# Patient Record
Sex: FEMALE | Race: OTHER | HISPANIC OR LATINO
[De-identification: names, ages, dates, MRNs, and addresses within clinical notes are randomized per-mention and may not be internally consistent; named-entity substitution may affect disease eponyms.]

---

## 2017-05-26 PROBLEM — Z00.00 ENCOUNTER FOR PREVENTIVE HEALTH EXAMINATION: Status: ACTIVE | Noted: 2017-05-26

## 2017-06-22 ENCOUNTER — APPOINTMENT (OUTPATIENT)
Dept: ANTEPARTUM | Facility: CLINIC | Age: 27
End: 2017-06-22

## 2017-06-22 ENCOUNTER — ASOB RESULT (OUTPATIENT)
Age: 27
End: 2017-06-22

## 2017-10-24 ENCOUNTER — INPATIENT (INPATIENT)
Facility: HOSPITAL | Age: 27
LOS: 1 days | Discharge: HOME | End: 2017-10-26
Attending: OBSTETRICS & GYNECOLOGY | Admitting: OBSTETRICS & GYNECOLOGY
Payer: COMMERCIAL

## 2017-10-24 VITALS — WEIGHT: 174.17 LBS | HEIGHT: 65 IN

## 2017-10-24 DIAGNOSIS — Z3A.00 WEEKS OF GESTATION OF PREGNANCY NOT SPECIFIED: ICD-10-CM

## 2017-10-24 DIAGNOSIS — O26.899 OTHER SPECIFIED PREGNANCY RELATED CONDITIONS, UNSPECIFIED TRIMESTER: ICD-10-CM

## 2017-10-24 DIAGNOSIS — Z34.80 ENCOUNTER FOR SUPERVISION OF OTHER NORMAL PREGNANCY, UNSPECIFIED TRIMESTER: ICD-10-CM

## 2017-10-24 LAB
BASOPHILS # BLD AUTO: 0.1 K/UL — SIGNIFICANT CHANGE UP (ref 0–0.2)
BASOPHILS NFR BLD AUTO: 0.4 % — SIGNIFICANT CHANGE UP (ref 0–2)
BLD GP AB SCN SERPL QL: NEGATIVE — SIGNIFICANT CHANGE UP
EOSINOPHIL # BLD AUTO: 0 K/UL — SIGNIFICANT CHANGE UP (ref 0–0.5)
EOSINOPHIL NFR BLD AUTO: 0.2 % — SIGNIFICANT CHANGE UP (ref 0–6)
HCT VFR BLD CALC: 38.4 % — SIGNIFICANT CHANGE UP (ref 34.5–45)
HGB BLD-MCNC: 12.9 G/DL — SIGNIFICANT CHANGE UP (ref 11.5–15.5)
LYMPHOCYTES # BLD AUTO: 1.4 K/UL — SIGNIFICANT CHANGE UP (ref 1–3.3)
LYMPHOCYTES # BLD AUTO: 9.6 % — LOW (ref 13–44)
MCHC RBC-ENTMCNC: 28.7 PG — SIGNIFICANT CHANGE UP (ref 27–34)
MCHC RBC-ENTMCNC: 33.5 GM/DL — SIGNIFICANT CHANGE UP (ref 32–36)
MCV RBC AUTO: 85.7 FL — SIGNIFICANT CHANGE UP (ref 80–100)
MONOCYTES # BLD AUTO: 0.8 K/UL — SIGNIFICANT CHANGE UP (ref 0–0.9)
MONOCYTES NFR BLD AUTO: 5.2 % — SIGNIFICANT CHANGE UP (ref 2–14)
NEUTROPHILS # BLD AUTO: 12.7 K/UL — HIGH (ref 1.8–7.4)
NEUTROPHILS NFR BLD AUTO: 84.6 % — HIGH (ref 43–77)
PLATELET # BLD AUTO: 219 K/UL — SIGNIFICANT CHANGE UP (ref 150–400)
RBC # BLD: 4.48 M/UL — SIGNIFICANT CHANGE UP (ref 3.8–5.2)
RBC # FLD: 11.4 % — SIGNIFICANT CHANGE UP (ref 10.3–14.5)
RH IG SCN BLD-IMP: POSITIVE — SIGNIFICANT CHANGE UP
RH IG SCN BLD-IMP: POSITIVE — SIGNIFICANT CHANGE UP
WBC # BLD: 15.1 K/UL — HIGH (ref 3.8–10.5)
WBC # FLD AUTO: 15.1 K/UL — HIGH (ref 3.8–10.5)

## 2017-10-24 RX ORDER — AMPICILLIN TRIHYDRATE 250 MG
CAPSULE ORAL
Qty: 0 | Refills: 0 | Status: DISCONTINUED | OUTPATIENT
Start: 2017-10-24 | End: 2017-10-24

## 2017-10-24 RX ORDER — OXYCODONE HYDROCHLORIDE 5 MG/1
5 TABLET ORAL EVERY 4 HOURS
Qty: 0 | Refills: 0 | Status: DISCONTINUED | OUTPATIENT
Start: 2017-10-24 | End: 2017-10-26

## 2017-10-24 RX ORDER — TETANUS TOXOID, REDUCED DIPHTHERIA TOXOID AND ACELLULAR PERTUSSIS VACCINE, ADSORBED 5; 2.5; 8; 8; 2.5 [IU]/.5ML; [IU]/.5ML; UG/.5ML; UG/.5ML; UG/.5ML
0.5 SUSPENSION INTRAMUSCULAR ONCE
Qty: 0 | Refills: 0 | Status: DISCONTINUED | OUTPATIENT
Start: 2017-10-25 | End: 2017-10-26

## 2017-10-24 RX ORDER — DOCUSATE SODIUM 100 MG
100 CAPSULE ORAL
Qty: 0 | Refills: 0 | Status: DISCONTINUED | OUTPATIENT
Start: 2017-10-25 | End: 2017-10-26

## 2017-10-24 RX ORDER — HYDROCORTISONE 1 %
1 OINTMENT (GRAM) TOPICAL EVERY 4 HOURS
Qty: 0 | Refills: 0 | Status: DISCONTINUED | OUTPATIENT
Start: 2017-10-25 | End: 2017-10-26

## 2017-10-24 RX ORDER — OXYTOCIN 10 UNIT/ML
41.67 VIAL (ML) INJECTION
Qty: 20 | Refills: 0 | Status: DISCONTINUED | OUTPATIENT
Start: 2017-10-25 | End: 2017-10-26

## 2017-10-24 RX ORDER — AER TRAVELER 0.5 G/1
1 SOLUTION RECTAL; TOPICAL EVERY 4 HOURS
Qty: 0 | Refills: 0 | Status: DISCONTINUED | OUTPATIENT
Start: 2017-10-25 | End: 2017-10-26

## 2017-10-24 RX ORDER — DIBUCAINE 1 %
1 OINTMENT (GRAM) RECTAL EVERY 4 HOURS
Qty: 0 | Refills: 0 | Status: DISCONTINUED | OUTPATIENT
Start: 2017-10-25 | End: 2017-10-26

## 2017-10-24 RX ORDER — AMPICILLIN TRIHYDRATE 250 MG
1 CAPSULE ORAL EVERY 4 HOURS
Qty: 0 | Refills: 0 | Status: DISCONTINUED | OUTPATIENT
Start: 2017-10-24 | End: 2017-10-24

## 2017-10-24 RX ORDER — SODIUM CHLORIDE 9 MG/ML
1000 INJECTION, SOLUTION INTRAVENOUS
Qty: 0 | Refills: 0 | Status: DISCONTINUED | OUTPATIENT
Start: 2017-10-24 | End: 2017-10-24

## 2017-10-24 RX ORDER — HYDROCORTISONE 1 %
1 OINTMENT (GRAM) TOPICAL EVERY 4 HOURS
Qty: 0 | Refills: 0 | Status: DISCONTINUED | OUTPATIENT
Start: 2017-10-24 | End: 2017-10-25

## 2017-10-24 RX ORDER — ONDANSETRON 8 MG/1
4 TABLET, FILM COATED ORAL ONCE
Qty: 0 | Refills: 0 | Status: COMPLETED | OUTPATIENT
Start: 2017-10-24 | End: 2017-10-24

## 2017-10-24 RX ORDER — OXYTOCIN 10 UNIT/ML
4 VIAL (ML) INJECTION
Qty: 30 | Refills: 0 | Status: DISCONTINUED | OUTPATIENT
Start: 2017-10-24 | End: 2017-10-26

## 2017-10-24 RX ORDER — MAGNESIUM HYDROXIDE 400 MG/1
30 TABLET, CHEWABLE ORAL
Qty: 0 | Refills: 0 | Status: DISCONTINUED | OUTPATIENT
Start: 2017-10-25 | End: 2017-10-26

## 2017-10-24 RX ORDER — LANOLIN
1 OINTMENT (GRAM) TOPICAL EVERY 6 HOURS
Qty: 0 | Refills: 0 | Status: DISCONTINUED | OUTPATIENT
Start: 2017-10-25 | End: 2017-10-26

## 2017-10-24 RX ORDER — KETOROLAC TROMETHAMINE 30 MG/ML
30 SYRINGE (ML) INJECTION ONCE
Qty: 0 | Refills: 0 | Status: DISCONTINUED | OUTPATIENT
Start: 2017-10-24 | End: 2017-10-26

## 2017-10-24 RX ORDER — AMPICILLIN TRIHYDRATE 250 MG
2 CAPSULE ORAL ONCE
Qty: 0 | Refills: 0 | Status: COMPLETED | OUTPATIENT
Start: 2017-10-24 | End: 2017-10-24

## 2017-10-24 RX ORDER — SIMETHICONE 80 MG/1
80 TABLET, CHEWABLE ORAL EVERY 6 HOURS
Qty: 0 | Refills: 0 | Status: DISCONTINUED | OUTPATIENT
Start: 2017-10-25 | End: 2017-10-26

## 2017-10-24 RX ORDER — DIPHENHYDRAMINE HCL 50 MG
25 CAPSULE ORAL EVERY 6 HOURS
Qty: 0 | Refills: 0 | Status: DISCONTINUED | OUTPATIENT
Start: 2017-10-25 | End: 2017-10-26

## 2017-10-24 RX ORDER — SODIUM CHLORIDE 9 MG/ML
500 INJECTION, SOLUTION INTRAVENOUS ONCE
Qty: 0 | Refills: 0 | Status: COMPLETED | OUTPATIENT
Start: 2017-10-24 | End: 2017-10-24

## 2017-10-24 RX ORDER — SODIUM CHLORIDE 9 MG/ML
3 INJECTION INTRAMUSCULAR; INTRAVENOUS; SUBCUTANEOUS EVERY 8 HOURS
Qty: 0 | Refills: 0 | Status: DISCONTINUED | OUTPATIENT
Start: 2017-10-25 | End: 2017-10-26

## 2017-10-24 RX ORDER — PRAMOXINE HYDROCHLORIDE 150 MG/15G
1 AEROSOL, FOAM RECTAL EVERY 4 HOURS
Qty: 0 | Refills: 0 | Status: DISCONTINUED | OUTPATIENT
Start: 2017-10-24 | End: 2017-10-25

## 2017-10-24 RX ORDER — OXYTOCIN 10 UNIT/ML
333.33 VIAL (ML) INJECTION
Qty: 20 | Refills: 0 | Status: DISCONTINUED | OUTPATIENT
Start: 2017-10-24 | End: 2017-10-25

## 2017-10-24 RX ORDER — OXYTOCIN 10 UNIT/ML
41.67 VIAL (ML) INJECTION
Qty: 20 | Refills: 0 | Status: DISCONTINUED | OUTPATIENT
Start: 2017-10-24 | End: 2017-10-26

## 2017-10-24 RX ORDER — PRAMOXINE HYDROCHLORIDE 150 MG/15G
1 AEROSOL, FOAM RECTAL EVERY 4 HOURS
Qty: 0 | Refills: 0 | Status: DISCONTINUED | OUTPATIENT
Start: 2017-10-25 | End: 2017-10-26

## 2017-10-24 RX ORDER — OXYCODONE HYDROCHLORIDE 5 MG/1
5 TABLET ORAL
Qty: 0 | Refills: 0 | Status: DISCONTINUED | OUTPATIENT
Start: 2017-10-24 | End: 2017-10-26

## 2017-10-24 RX ORDER — IBUPROFEN 200 MG
600 TABLET ORAL EVERY 6 HOURS
Qty: 0 | Refills: 0 | Status: COMPLETED | OUTPATIENT
Start: 2017-10-24 | End: 2018-09-22

## 2017-10-24 RX ORDER — ACETAMINOPHEN 500 MG
975 TABLET ORAL EVERY 6 HOURS
Qty: 0 | Refills: 0 | Status: COMPLETED | OUTPATIENT
Start: 2017-10-24 | End: 2018-09-22

## 2017-10-24 RX ORDER — GLYCERIN ADULT
1 SUPPOSITORY, RECTAL RECTAL AT BEDTIME
Qty: 0 | Refills: 0 | Status: DISCONTINUED | OUTPATIENT
Start: 2017-10-25 | End: 2017-10-26

## 2017-10-24 RX ORDER — SODIUM CHLORIDE 9 MG/ML
3 INJECTION INTRAMUSCULAR; INTRAVENOUS; SUBCUTANEOUS EVERY 8 HOURS
Qty: 0 | Refills: 0 | Status: DISCONTINUED | OUTPATIENT
Start: 2017-10-24 | End: 2017-10-25

## 2017-10-24 RX ORDER — AER TRAVELER 0.5 G/1
1 SOLUTION RECTAL; TOPICAL EVERY 4 HOURS
Qty: 0 | Refills: 0 | Status: DISCONTINUED | OUTPATIENT
Start: 2017-10-24 | End: 2017-10-25

## 2017-10-24 RX ORDER — DIBUCAINE 1 %
1 OINTMENT (GRAM) RECTAL EVERY 4 HOURS
Qty: 0 | Refills: 0 | Status: DISCONTINUED | OUTPATIENT
Start: 2017-10-24 | End: 2017-10-25

## 2017-10-24 RX ORDER — CITRIC ACID/SODIUM CITRATE 300-500 MG
15 SOLUTION, ORAL ORAL EVERY 4 HOURS
Qty: 0 | Refills: 0 | Status: DISCONTINUED | OUTPATIENT
Start: 2017-10-24 | End: 2017-10-24

## 2017-10-24 RX ADMIN — Medication 125 MILLIUNIT(S)/MIN: at 20:22

## 2017-10-24 RX ADMIN — ONDANSETRON 4 MILLIGRAM(S): 8 TABLET, FILM COATED ORAL at 16:30

## 2017-10-24 RX ADMIN — Medication 15 MILLILITER(S): at 17:11

## 2017-10-24 RX ADMIN — Medication 216 GRAM(S): at 16:20

## 2017-10-24 RX ADMIN — Medication 4 MILLIUNIT(S)/MIN: at 17:44

## 2017-10-24 RX ADMIN — SODIUM CHLORIDE 125 MILLILITER(S): 9 INJECTION, SOLUTION INTRAVENOUS at 16:31

## 2017-10-24 RX ADMIN — SODIUM CHLORIDE 1000 MILLILITER(S): 9 INJECTION, SOLUTION INTRAVENOUS at 16:00

## 2017-10-25 ENCOUNTER — TRANSCRIPTION ENCOUNTER (OUTPATIENT)
Age: 27
End: 2017-10-25

## 2017-10-25 LAB
HBV SURFACE AG SERPL QL IA: SIGNIFICANT CHANGE UP
HCT VFR BLD CALC: 34.3 % — LOW (ref 34.5–45)
HGB BLD-MCNC: 10.9 G/DL — LOW (ref 11.5–15.5)
RUBV IGG SER-ACNC: 4.4 INDEX — SIGNIFICANT CHANGE UP
RUBV IGG SER-IMP: POSITIVE — SIGNIFICANT CHANGE UP
T PALLIDUM AB TITR SER: NEGATIVE — SIGNIFICANT CHANGE UP

## 2017-10-25 RX ORDER — ACETAMINOPHEN 500 MG
3 TABLET ORAL
Qty: 0 | Refills: 0 | DISCHARGE
Start: 2017-10-25

## 2017-10-25 RX ORDER — DOCUSATE SODIUM 100 MG
1 CAPSULE ORAL
Qty: 0 | Refills: 0 | DISCHARGE
Start: 2017-10-25

## 2017-10-25 RX ORDER — IBUPROFEN 200 MG
1 TABLET ORAL
Qty: 0 | Refills: 0 | DISCHARGE
Start: 2017-10-25

## 2017-10-25 RX ORDER — SIMETHICONE 80 MG/1
1 TABLET, CHEWABLE ORAL
Qty: 0 | Refills: 0 | DISCHARGE
Start: 2017-10-25

## 2017-10-25 RX ORDER — ACETAMINOPHEN 500 MG
975 TABLET ORAL EVERY 6 HOURS
Qty: 0 | Refills: 0 | Status: DISCONTINUED | OUTPATIENT
Start: 2017-10-25 | End: 2017-10-26

## 2017-10-25 RX ORDER — INFLUENZA VIRUS VACCINE 15; 15; 15; 15 UG/.5ML; UG/.5ML; UG/.5ML; UG/.5ML
0.5 SUSPENSION INTRAMUSCULAR ONCE
Qty: 0 | Refills: 0 | Status: COMPLETED | OUTPATIENT
Start: 2017-10-25 | End: 2017-10-26

## 2017-10-25 RX ORDER — IBUPROFEN 200 MG
600 TABLET ORAL EVERY 6 HOURS
Qty: 0 | Refills: 0 | Status: DISCONTINUED | OUTPATIENT
Start: 2017-10-25 | End: 2017-10-26

## 2017-10-25 RX ADMIN — Medication 975 MILLIGRAM(S): at 23:46

## 2017-10-25 RX ADMIN — Medication 100 MILLIGRAM(S): at 06:10

## 2017-10-25 RX ADMIN — Medication 600 MILLIGRAM(S): at 06:12

## 2017-10-25 RX ADMIN — Medication 975 MILLIGRAM(S): at 19:10

## 2017-10-25 RX ADMIN — Medication 600 MILLIGRAM(S): at 23:46

## 2017-10-25 RX ADMIN — Medication 975 MILLIGRAM(S): at 18:33

## 2017-10-25 RX ADMIN — Medication 975 MILLIGRAM(S): at 06:12

## 2017-10-25 RX ADMIN — Medication 600 MILLIGRAM(S): at 12:32

## 2017-10-25 RX ADMIN — Medication 975 MILLIGRAM(S): at 13:15

## 2017-10-25 RX ADMIN — Medication 975 MILLIGRAM(S): at 12:32

## 2017-10-25 RX ADMIN — Medication 600 MILLIGRAM(S): at 19:10

## 2017-10-25 RX ADMIN — Medication 600 MILLIGRAM(S): at 13:15

## 2017-10-25 RX ADMIN — Medication 600 MILLIGRAM(S): at 18:33

## 2017-10-25 RX ADMIN — Medication 1 TABLET(S): at 12:32

## 2017-10-25 NOTE — DISCHARGE NOTE OB - CARE PLAN
Principal Discharge DX:	Vaginal delivery  Goal:	routine post partum course  Instructions for follow-up, activity and diet:	follow-up in 6 weeks

## 2017-10-25 NOTE — DISCHARGE NOTE OB - PATIENT PORTAL LINK FT
“You can access the FollowHealth Patient Portal, offered by [facility], by registering with the following website: [email or web address]”

## 2017-10-25 NOTE — DISCHARGE NOTE OB - MEDICATION SUMMARY - MEDICATIONS TO TAKE
I will START or STAY ON the medications listed below when I get home from the hospital:    acetaminophen 325 mg oral tablet  -- 3 tab(s) by mouth every 6 hours  -- Indication: For pain    ibuprofen 600 mg oral tablet  -- 1 tab(s) by mouth every 6 hours  -- Indication: For pain    docusate sodium 100 mg oral capsule  -- 1 cap(s) by mouth 2 times a day, As needed, Stool Softening  -- Indication: For constipation    simethicone 80 mg oral tablet, chewable  -- 1 tab(s) by mouth every 6 hours, As needed, Gas  -- Indication: For gas

## 2017-10-25 NOTE — PROGRESS NOTE ADULT - ASSESSMENT
A/P:  26y  PPD # 1      S/P                      doing well    PMHx: hx of abnl pap s/p TASHA, 2014 TOP, lasik, rt yolette de la torrex  Current Issues:

## 2017-10-25 NOTE — DISCHARGE NOTE OB - CARE PROVIDER_API CALL
Forrest Parmar (MD), Obstetrics and Gynecology  [address] [place] [address]  Phone: [phone]  Fax: [phone]

## 2017-10-25 NOTE — PROGRESS NOTE ADULT - SUBJECTIVE AND OBJECTIVE BOX
Postpartum Note- PPD#1    Allergies    No Known Allergies    Intolerances        RPR Negative  Blood Type  A  --  Positive        S:Patient is a  26y   G  [id number]      PPD#1         S/P      Patient w/o complaints, pain is controlled.    Pt is OOB, tolerating PO, passing flatus. Lochia WNL.     O:  Vital Signs Last 24 Hrs  T(C): 36.7 (25 Oct 2017 05:00), Max: 36.9 (24 Oct 2017 19:50)  T(F): 98.1 (25 Oct 2017 05:00), Max: 98.4 (24 Oct 2017 19:50)  HR: 71 (25 Oct 2017 05:00) (68 - 87)  BP: 102/62 (25 Oct 2017 05:00) (92/55 - 123/58)  BP(mean): --  RR: 18 (25 Oct 2017 05:00) (18 - 18)  SpO2: 98% (24 Oct 2017 21:20) (97% - 100%)     Gen: NAD  Abdomen: Soft, nontender, non-distended, fundus firm.  Lochia WNL  Ext: Neg edema, Neg calf tenderness    LABS:    Hemoglobin: 12.9 g/dL (10-24-17 @ 16:06)      Hematocrit: 38.4 % (10-24-17 @ 16:06)

## 2017-10-26 VITALS
SYSTOLIC BLOOD PRESSURE: 95 MMHG | HEART RATE: 60 BPM | TEMPERATURE: 98 F | DIASTOLIC BLOOD PRESSURE: 60 MMHG | RESPIRATION RATE: 18 BRPM

## 2017-10-26 PROCEDURE — 87340 HEPATITIS B SURFACE AG IA: CPT

## 2017-10-26 PROCEDURE — G0463: CPT

## 2017-10-26 PROCEDURE — 59050 FETAL MONITOR W/REPORT: CPT

## 2017-10-26 PROCEDURE — 86850 RBC ANTIBODY SCREEN: CPT

## 2017-10-26 PROCEDURE — 59025 FETAL NON-STRESS TEST: CPT

## 2017-10-26 PROCEDURE — 86780 TREPONEMA PALLIDUM: CPT

## 2017-10-26 PROCEDURE — 85027 COMPLETE CBC AUTOMATED: CPT

## 2017-10-26 PROCEDURE — 85018 HEMOGLOBIN: CPT

## 2017-10-26 PROCEDURE — 90686 IIV4 VACC NO PRSV 0.5 ML IM: CPT

## 2017-10-26 PROCEDURE — 86901 BLOOD TYPING SEROLOGIC RH(D): CPT

## 2017-10-26 PROCEDURE — 86900 BLOOD TYPING SEROLOGIC ABO: CPT

## 2017-10-26 PROCEDURE — 86762 RUBELLA ANTIBODY: CPT

## 2017-10-26 RX ADMIN — INFLUENZA VIRUS VACCINE 0.5 MILLILITER(S): 15; 15; 15; 15 SUSPENSION INTRAMUSCULAR at 06:41

## 2017-10-26 RX ADMIN — Medication 600 MILLIGRAM(S): at 07:30

## 2017-10-26 RX ADMIN — Medication 975 MILLIGRAM(S): at 07:30

## 2017-10-26 RX ADMIN — Medication 975 MILLIGRAM(S): at 00:20

## 2017-10-26 RX ADMIN — Medication 600 MILLIGRAM(S): at 00:20

## 2017-10-26 RX ADMIN — Medication 975 MILLIGRAM(S): at 06:41

## 2017-10-26 RX ADMIN — Medication 600 MILLIGRAM(S): at 06:41

## 2017-10-26 NOTE — PROGRESS NOTE ADULT - ATTENDING COMMENTS
Agree with assessment and plan. Pt doing well without complaints.   Vitals stable. Exam Benign  - Routine post partum care
Agree with assessment and plan. Pt doing well without complaints.   Vitals stable. Exam Benign  - Routine post partum care

## 2017-10-26 NOTE — PROGRESS NOTE ADULT - SUBJECTIVE AND OBJECTIVE BOX
S: Patient doing well. Minimal lochia. Pain controlled.    O: Vital Signs Last 24 Hrs  T(C): 36.8 (26 Oct 2017 05:00), Max: 36.9 (25 Oct 2017 18:00)  T(F): 98.2 (26 Oct 2017 05:00), Max: 98.4 (25 Oct 2017 18:00)  HR: 60 (26 Oct 2017 05:00) (60 - 79)  BP: 95/60 (26 Oct 2017 05:00) (95/60 - 105/67)  BP(mean): --  RR: 18 (26 Oct 2017 05:00) (18 - 18)  SpO2: --    Gen: NAD  Abd: soft, NT, ND, fundus firm below umbilicus  Lochia: moderate  Ext: no tenderness    Labs:                        10.9   x     )-----------( x        ( 25 Oct 2017 07:30 )             34.3       A: 26y PPD# s/p  doing well.    Plan:

## 2020-05-21 ENCOUNTER — APPOINTMENT (OUTPATIENT)
Dept: ANTEPARTUM | Facility: CLINIC | Age: 30
End: 2020-05-21
Payer: COMMERCIAL

## 2020-05-21 PROCEDURE — 76815 OB US LIMITED FETUS(S): CPT

## 2020-05-21 PROCEDURE — 76819 FETAL BIOPHYS PROFIL W/O NST: CPT

## 2020-05-22 ENCOUNTER — APPOINTMENT (OUTPATIENT)
Dept: ANTEPARTUM | Facility: CLINIC | Age: 30
End: 2020-05-22

## 2020-07-06 ENCOUNTER — INPATIENT (INPATIENT)
Facility: HOSPITAL | Age: 30
LOS: 0 days | Discharge: HOME | End: 2020-07-07
Attending: SPECIALIST | Admitting: SPECIALIST

## 2020-07-06 ENCOUNTER — TRANSCRIPTION ENCOUNTER (OUTPATIENT)
Age: 30
End: 2020-07-06

## 2020-07-06 VITALS
TEMPERATURE: 99 F | SYSTOLIC BLOOD PRESSURE: 107 MMHG | HEART RATE: 85 BPM | DIASTOLIC BLOOD PRESSURE: 68 MMHG | RESPIRATION RATE: 16 BRPM

## 2020-07-06 DIAGNOSIS — Z98.890 OTHER SPECIFIED POSTPROCEDURAL STATES: Chronic | ICD-10-CM

## 2020-07-06 DIAGNOSIS — O26.899 OTHER SPECIFIED PREGNANCY RELATED CONDITIONS, UNSPECIFIED TRIMESTER: ICD-10-CM

## 2020-07-06 DIAGNOSIS — Z3A.00 WEEKS OF GESTATION OF PREGNANCY NOT SPECIFIED: ICD-10-CM

## 2020-07-06 LAB
BASOPHILS # BLD AUTO: 0.03 K/UL — SIGNIFICANT CHANGE UP (ref 0–0.2)
BASOPHILS NFR BLD AUTO: 0.3 % — SIGNIFICANT CHANGE UP (ref 0–2)
BLD GP AB SCN SERPL QL: NEGATIVE — SIGNIFICANT CHANGE UP
EOSINOPHIL # BLD AUTO: 0.1 K/UL — SIGNIFICANT CHANGE UP (ref 0–0.5)
EOSINOPHIL NFR BLD AUTO: 1.1 % — SIGNIFICANT CHANGE UP (ref 0–6)
HCT VFR BLD CALC: 36.1 % — SIGNIFICANT CHANGE UP (ref 34.5–45)
HGB BLD-MCNC: 11.6 G/DL — SIGNIFICANT CHANGE UP (ref 11.5–15.5)
IMM GRANULOCYTES NFR BLD AUTO: 0.5 % — SIGNIFICANT CHANGE UP (ref 0–1.5)
LYMPHOCYTES # BLD AUTO: 2.08 K/UL — SIGNIFICANT CHANGE UP (ref 1–3.3)
LYMPHOCYTES # BLD AUTO: 22.3 % — SIGNIFICANT CHANGE UP (ref 13–44)
MCHC RBC-ENTMCNC: 25.8 PG — LOW (ref 27–34)
MCHC RBC-ENTMCNC: 32.1 % — SIGNIFICANT CHANGE UP (ref 32–36)
MCV RBC AUTO: 80.2 FL — SIGNIFICANT CHANGE UP (ref 80–100)
MONOCYTES # BLD AUTO: 0.81 K/UL — SIGNIFICANT CHANGE UP (ref 0–0.9)
MONOCYTES NFR BLD AUTO: 8.7 % — SIGNIFICANT CHANGE UP (ref 2–14)
NEUTROPHILS # BLD AUTO: 6.26 K/UL — SIGNIFICANT CHANGE UP (ref 1.8–7.4)
NEUTROPHILS NFR BLD AUTO: 67.1 % — SIGNIFICANT CHANGE UP (ref 43–77)
NRBC # FLD: 0 K/UL — SIGNIFICANT CHANGE UP (ref 0–0)
PLATELET # BLD AUTO: 238 K/UL — SIGNIFICANT CHANGE UP (ref 150–400)
PMV BLD: 9.8 FL — SIGNIFICANT CHANGE UP (ref 7–13)
RBC # BLD: 4.5 M/UL — SIGNIFICANT CHANGE UP (ref 3.8–5.2)
RBC # FLD: 14.8 % — HIGH (ref 10.3–14.5)
RH IG SCN BLD-IMP: POSITIVE — SIGNIFICANT CHANGE UP
RH IG SCN BLD-IMP: POSITIVE — SIGNIFICANT CHANGE UP
SARS-COV-2 RNA SPEC QL NAA+PROBE: SIGNIFICANT CHANGE UP
T PALLIDUM AB TITR SER: NEGATIVE — SIGNIFICANT CHANGE UP
WBC # BLD: 9.33 K/UL — SIGNIFICANT CHANGE UP (ref 3.8–10.5)
WBC # FLD AUTO: 9.33 K/UL — SIGNIFICANT CHANGE UP (ref 3.8–10.5)

## 2020-07-06 RX ORDER — IBUPROFEN 200 MG
600 TABLET ORAL EVERY 6 HOURS
Refills: 0 | Status: DISCONTINUED | OUTPATIENT
Start: 2020-07-06 | End: 2020-07-07

## 2020-07-06 RX ORDER — LANOLIN
1 OINTMENT (GRAM) TOPICAL EVERY 6 HOURS
Refills: 0 | Status: DISCONTINUED | OUTPATIENT
Start: 2020-07-06 | End: 2020-07-07

## 2020-07-06 RX ORDER — AER TRAVELER 0.5 G/1
1 SOLUTION RECTAL; TOPICAL EVERY 4 HOURS
Refills: 0 | Status: DISCONTINUED | OUTPATIENT
Start: 2020-07-06 | End: 2020-07-07

## 2020-07-06 RX ORDER — SODIUM CHLORIDE 9 MG/ML
1000 INJECTION, SOLUTION INTRAVENOUS
Refills: 0 | Status: DISCONTINUED | OUTPATIENT
Start: 2020-07-06 | End: 2020-07-06

## 2020-07-06 RX ORDER — DIBUCAINE 1 %
1 OINTMENT (GRAM) RECTAL EVERY 6 HOURS
Refills: 0 | Status: DISCONTINUED | OUTPATIENT
Start: 2020-07-06 | End: 2020-07-07

## 2020-07-06 RX ORDER — SODIUM CHLORIDE 9 MG/ML
3 INJECTION INTRAMUSCULAR; INTRAVENOUS; SUBCUTANEOUS EVERY 8 HOURS
Refills: 0 | Status: DISCONTINUED | OUTPATIENT
Start: 2020-07-06 | End: 2020-07-07

## 2020-07-06 RX ORDER — BENZOCAINE 10 %
1 GEL (GRAM) MUCOUS MEMBRANE EVERY 6 HOURS
Refills: 0 | Status: DISCONTINUED | OUTPATIENT
Start: 2020-07-06 | End: 2020-07-07

## 2020-07-06 RX ORDER — OXYTOCIN 10 UNIT/ML
333.33 VIAL (ML) INJECTION
Qty: 20 | Refills: 0 | Status: DISCONTINUED | OUTPATIENT
Start: 2020-07-06 | End: 2020-07-06

## 2020-07-06 RX ORDER — AMPICILLIN TRIHYDRATE 250 MG
1 CAPSULE ORAL EVERY 4 HOURS
Refills: 0 | Status: DISCONTINUED | OUTPATIENT
Start: 2020-07-06 | End: 2020-07-06

## 2020-07-06 RX ORDER — ACETAMINOPHEN 500 MG
975 TABLET ORAL
Refills: 0 | Status: DISCONTINUED | OUTPATIENT
Start: 2020-07-06 | End: 2020-07-07

## 2020-07-06 RX ORDER — KETOROLAC TROMETHAMINE 30 MG/ML
30 SYRINGE (ML) INJECTION ONCE
Refills: 0 | Status: DISCONTINUED | OUTPATIENT
Start: 2020-07-06 | End: 2020-07-06

## 2020-07-06 RX ORDER — MAGNESIUM HYDROXIDE 400 MG/1
30 TABLET, CHEWABLE ORAL
Refills: 0 | Status: DISCONTINUED | OUTPATIENT
Start: 2020-07-06 | End: 2020-07-07

## 2020-07-06 RX ORDER — IBUPROFEN 200 MG
600 TABLET ORAL EVERY 6 HOURS
Refills: 0 | Status: COMPLETED | OUTPATIENT
Start: 2020-07-06 | End: 2021-06-04

## 2020-07-06 RX ORDER — PRAMOXINE HYDROCHLORIDE 150 MG/15G
1 AEROSOL, FOAM RECTAL EVERY 4 HOURS
Refills: 0 | Status: DISCONTINUED | OUTPATIENT
Start: 2020-07-06 | End: 2020-07-07

## 2020-07-06 RX ORDER — TETANUS TOXOID, REDUCED DIPHTHERIA TOXOID AND ACELLULAR PERTUSSIS VACCINE, ADSORBED 5; 2.5; 8; 8; 2.5 [IU]/.5ML; [IU]/.5ML; UG/.5ML; UG/.5ML; UG/.5ML
0.5 SUSPENSION INTRAMUSCULAR ONCE
Refills: 0 | Status: DISCONTINUED | OUTPATIENT
Start: 2020-07-06 | End: 2020-07-07

## 2020-07-06 RX ORDER — AMPICILLIN TRIHYDRATE 250 MG
2 CAPSULE ORAL ONCE
Refills: 0 | Status: COMPLETED | OUTPATIENT
Start: 2020-07-06 | End: 2020-07-06

## 2020-07-06 RX ORDER — HYDROCORTISONE 1 %
1 OINTMENT (GRAM) TOPICAL EVERY 6 HOURS
Refills: 0 | Status: DISCONTINUED | OUTPATIENT
Start: 2020-07-06 | End: 2020-07-07

## 2020-07-06 RX ORDER — SIMETHICONE 80 MG/1
80 TABLET, CHEWABLE ORAL EVERY 4 HOURS
Refills: 0 | Status: DISCONTINUED | OUTPATIENT
Start: 2020-07-06 | End: 2020-07-07

## 2020-07-06 RX ORDER — DIPHENHYDRAMINE HCL 50 MG
25 CAPSULE ORAL EVERY 6 HOURS
Refills: 0 | Status: DISCONTINUED | OUTPATIENT
Start: 2020-07-06 | End: 2020-07-07

## 2020-07-06 RX ADMIN — SODIUM CHLORIDE 3 MILLILITER(S): 9 INJECTION INTRAMUSCULAR; INTRAVENOUS; SUBCUTANEOUS at 22:00

## 2020-07-06 RX ADMIN — Medication 600 MILLIGRAM(S): at 18:15

## 2020-07-06 RX ADMIN — SODIUM CHLORIDE 125 MILLILITER(S): 9 INJECTION, SOLUTION INTRAVENOUS at 06:58

## 2020-07-06 RX ADMIN — Medication 975 MILLIGRAM(S): at 22:00

## 2020-07-06 RX ADMIN — Medication 30 MILLIGRAM(S): at 09:14

## 2020-07-06 RX ADMIN — Medication 216 GRAM(S): at 06:55

## 2020-07-06 RX ADMIN — Medication 1000 MILLIUNIT(S)/MIN: at 09:15

## 2020-07-06 RX ADMIN — Medication 975 MILLIGRAM(S): at 11:48

## 2020-07-06 RX ADMIN — SODIUM CHLORIDE 3 MILLILITER(S): 9 INJECTION INTRAMUSCULAR; INTRAVENOUS; SUBCUTANEOUS at 17:17

## 2020-07-06 NOTE — OB PROVIDER DELIVERY SUMMARY - NSPROVIDERDELIVERYNOTE_OBGYN_ALL_OB_FT
ob attending    pt with atony after delivery-- given 56590gel cytotec [place] x1, pp pitocin 40u, mccabe to gravity    resolved brisk bleeding, uterus empty by palpation    pt low risk for DVT, sepsis  Taniya BHARDWAJ

## 2020-07-06 NOTE — OB PROVIDER TRIAGE NOTE - HISTORY OF PRESENT ILLNESS
30y/o  @38.5wks presents with painful contractions since this morning, pain scale 10/10.  Reports good fetal movement  Denies LOF/VB    Allergies: Denies  Medications: PNV    Medical HX: Denies  Surgical HX: foot sx , eye sx , leep 2013  Denies Psy/Etoh/Smoke/Drugs

## 2020-07-06 NOTE — DISCHARGE NOTE OB - PATIENT PORTAL LINK FT
You can access the FollowMyHealth Patient Portal offered by [facility] by registering at the following website: [email or web address]. By joining [employer]’s FollowMyHealth portal, you will also be able to view your health information using other applications (apps) compatible with our system.

## 2020-07-06 NOTE — OB PROVIDER TRIAGE NOTE - NSHPPHYSICALEXAM_GEN_ALL_CORE
Vital Signs Last 24 Hrs  T(C): 37 (06 Jul 2020 06:41), Max: 37 (06 Jul 2020 06:41)  T(F): 98.6 (06 Jul 2020 06:41), Max: 98.6 (06 Jul 2020 06:41)  HR: 85 (06 Jul 2020 06:41) (85 - 85)  BP: 107/68 (06 Jul 2020 06:41) (107/68 - 107/68)  RR: 16 (06 Jul 2020 06:41) (16 - 16)    Assessment reveals VSS  Abdomen soft, NT, gravid  Cat 1 tracing, ctx every 4mins  Transabdominal Ultrasound- vtx   Vaginal Exam- 5/90/-3  A&Ox3  Lungs- clear bilateral  Heart- normal rate and rhythm      PLAN: Admit for Labor

## 2020-07-06 NOTE — DISCHARGE NOTE OB - MEDICATION SUMMARY - MEDICATIONS TO TAKE
I will START or STAY ON the medications listed below when I get home from the hospital:    PNV Prenatal oral tablet  -- 1 tab(s) by mouth once a day  -- Indication: For Normal vaginal delivery

## 2020-07-06 NOTE — PROVIDER CONTACT NOTE (OTHER) - ASSESSMENT
fundus firm, moderate lochia, orthostatics vital signs wnl, tolerated ambulating, u/a 100 cc of clear urine.

## 2020-07-06 NOTE — DISCHARGE NOTE OB - CARE PLAN
Principal Discharge DX:	Normal vaginal delivery  Goal:	recovery  Assessment and plan of treatment:	regular

## 2020-07-06 NOTE — OB PROVIDER H&P - PROBLEM SELECTOR PLAN 1
Admit for Labor  D/W Dr. Magaña  Routine Orders  Epidural for pain management  Covid swabbed and partner   Ampicillin for GBS positive

## 2020-07-06 NOTE — DISCHARGE NOTE OB - CARE PROVIDER_API CALL
Edgardo Mills  OBSTETRICS AND GYNECOLOGY  [address]  [place] [address]  Phone: [phone]  Fax: [phone]  Follow Up Time:

## 2020-07-06 NOTE — OB RN DELIVERY SUMMARY - NS_SEPSISRSKCALC_OBGYN_ALL_OB_FT
EOS calculated successfully. EOS Risk Factor: 0.5/1000 live births ([facility] national incidence); GA=38w5d; Temp=98.6; ROM=0.583; GBS='Positive'; Antibiotics='No antibiotics or any antibiotics < 2 hrs prior to birth'

## 2020-07-06 NOTE — OB PROVIDER TRIAGE NOTE - NS_OBGYNHISTORY_OBGYN_ALL_OB_FT
GYN: Positive HPV, Leep   OB: TOPx1         10/24/2017, 38wks 6#4      AP course uncomplicated  -GBS Positive

## 2020-07-07 VITALS
RESPIRATION RATE: 18 BRPM | HEART RATE: 62 BPM | SYSTOLIC BLOOD PRESSURE: 106 MMHG | OXYGEN SATURATION: 100 % | DIASTOLIC BLOOD PRESSURE: 64 MMHG | TEMPERATURE: 98 F

## 2020-07-07 RX ADMIN — SODIUM CHLORIDE 3 MILLILITER(S): 9 INJECTION INTRAMUSCULAR; INTRAVENOUS; SUBCUTANEOUS at 06:36

## 2020-07-07 NOTE — PROGRESS NOTE ADULT - SUBJECTIVE AND OBJECTIVE BOX
S: Patient doing well. Minimal lochia. Pain controlled.  Post Partum day : 1  O: Vital Signs Last 24 Hrs  T(C): 36.6 (2020 06:18), Max: 37.3 (2020 12:00)  T(F): 97.9 (2020 06:18), Max: 99.1 (2020 12:00)  HR: 62 (2020 06:18) (62 - 86)  BP: 106/64 (2020 06:18) (99/50 - 114/56)  BP(mean): --  RR: 18 (2020 06:18) (14 - 18)  SpO2: 100% (2020 06:18) (98% - 100%)    Gen: No acute distress  Abd: soft, NT,  fundus firm below umbilicus  Lochia: Normal  Ext: no tenderness    Labs:                        11.6   9.33  )-----------( 238      ( 2020 06:33 )             36.1       A: 29y PPD # 1 s/p  doing well.    Plan: Discharge home

## 2021-01-05 ENCOUNTER — APPOINTMENT (OUTPATIENT)
Dept: OBGYN | Facility: CLINIC | Age: 31
End: 2021-01-05
Payer: COMMERCIAL

## 2021-01-05 PROCEDURE — 99072 ADDL SUPL MATRL&STAF TM PHE: CPT

## 2021-01-05 PROCEDURE — 36415 COLL VENOUS BLD VENIPUNCTURE: CPT

## 2021-01-05 PROCEDURE — 99395 PREV VISIT EST AGE 18-39: CPT

## 2021-01-13 ENCOUNTER — APPOINTMENT (OUTPATIENT)
Dept: ANTEPARTUM | Facility: CLINIC | Age: 31
End: 2021-01-13
Payer: COMMERCIAL

## 2021-01-13 PROCEDURE — 99072 ADDL SUPL MATRL&STAF TM PHE: CPT

## 2021-01-13 PROCEDURE — 76813 OB US NUCHAL MEAS 1 GEST: CPT

## 2021-01-13 PROCEDURE — 76801 OB US < 14 WKS SINGLE FETUS: CPT

## 2021-02-10 ENCOUNTER — APPOINTMENT (OUTPATIENT)
Dept: OBGYN | Facility: CLINIC | Age: 31
End: 2021-02-10

## 2021-02-17 ENCOUNTER — APPOINTMENT (OUTPATIENT)
Dept: OBGYN | Facility: CLINIC | Age: 31
End: 2021-02-17
Payer: COMMERCIAL

## 2021-02-17 PROCEDURE — 0502F SUBSEQUENT PRENATAL CARE: CPT

## 2021-03-10 ENCOUNTER — APPOINTMENT (OUTPATIENT)
Dept: ANTEPARTUM | Facility: CLINIC | Age: 31
End: 2021-03-10

## 2021-03-24 ENCOUNTER — APPOINTMENT (OUTPATIENT)
Dept: ANTEPARTUM | Facility: CLINIC | Age: 31
End: 2021-03-24
Payer: MEDICAID

## 2021-03-24 PROCEDURE — 76811 OB US DETAILED SNGL FETUS: CPT

## 2021-03-24 PROCEDURE — 76817 TRANSVAGINAL US OBSTETRIC: CPT

## 2021-03-24 PROCEDURE — 99072 ADDL SUPL MATRL&STAF TM PHE: CPT

## 2021-04-14 ENCOUNTER — APPOINTMENT (OUTPATIENT)
Dept: OBGYN | Facility: CLINIC | Age: 31
End: 2021-04-14
Payer: MEDICAID

## 2021-04-14 PROCEDURE — 0502F SUBSEQUENT PRENATAL CARE: CPT

## 2021-05-05 ENCOUNTER — APPOINTMENT (OUTPATIENT)
Dept: OBGYN | Facility: CLINIC | Age: 31
End: 2021-05-05
Payer: MEDICAID

## 2021-05-05 ENCOUNTER — NON-APPOINTMENT (OUTPATIENT)
Age: 31
End: 2021-05-05

## 2021-05-05 PROCEDURE — 0502F SUBSEQUENT PRENATAL CARE: CPT

## 2021-05-19 ENCOUNTER — APPOINTMENT (OUTPATIENT)
Dept: ANTEPARTUM | Facility: CLINIC | Age: 31
End: 2021-05-19
Payer: MEDICAID

## 2021-05-19 PROCEDURE — 76819 FETAL BIOPHYS PROFIL W/O NST: CPT

## 2021-05-19 PROCEDURE — 76816 OB US FOLLOW-UP PER FETUS: CPT

## 2021-06-02 ENCOUNTER — APPOINTMENT (OUTPATIENT)
Dept: OBGYN | Facility: CLINIC | Age: 31
End: 2021-06-02

## 2021-06-16 ENCOUNTER — APPOINTMENT (OUTPATIENT)
Dept: OBGYN | Facility: CLINIC | Age: 31
End: 2021-06-16
Payer: MEDICAID

## 2021-06-16 PROCEDURE — 0502F SUBSEQUENT PRENATAL CARE: CPT

## 2021-06-30 ENCOUNTER — APPOINTMENT (OUTPATIENT)
Dept: OBGYN | Facility: CLINIC | Age: 31
End: 2021-06-30
Payer: MEDICAID

## 2021-06-30 PROCEDURE — 0502F SUBSEQUENT PRENATAL CARE: CPT

## 2021-07-07 ENCOUNTER — APPOINTMENT (OUTPATIENT)
Dept: ANTEPARTUM | Facility: CLINIC | Age: 31
End: 2021-07-07
Payer: MEDICAID

## 2021-07-07 ENCOUNTER — APPOINTMENT (OUTPATIENT)
Dept: OBGYN | Facility: CLINIC | Age: 31
End: 2021-07-07
Payer: MEDICAID

## 2021-07-07 PROCEDURE — 76816 OB US FOLLOW-UP PER FETUS: CPT

## 2021-07-07 PROCEDURE — ZZZZZ: CPT

## 2021-07-07 PROCEDURE — 76819 FETAL BIOPHYS PROFIL W/O NST: CPT

## 2021-07-14 ENCOUNTER — APPOINTMENT (OUTPATIENT)
Dept: OBGYN | Facility: CLINIC | Age: 31
End: 2021-07-14
Payer: MEDICAID

## 2021-07-14 PROCEDURE — 0502F SUBSEQUENT PRENATAL CARE: CPT

## 2021-07-19 ENCOUNTER — ASOB RESULT (OUTPATIENT)
Age: 31
End: 2021-07-19

## 2021-07-19 ENCOUNTER — APPOINTMENT (OUTPATIENT)
Dept: ANTEPARTUM | Facility: CLINIC | Age: 31
End: 2021-07-19

## 2021-07-19 ENCOUNTER — OUTPATIENT (OUTPATIENT)
Dept: OUTPATIENT SERVICES | Facility: HOSPITAL | Age: 31
LOS: 1 days | Discharge: HOME | End: 2021-07-19
Payer: MEDICAID

## 2021-07-19 VITALS
TEMPERATURE: 98 F | RESPIRATION RATE: 16 BRPM | DIASTOLIC BLOOD PRESSURE: 65 MMHG | HEART RATE: 87 BPM | SYSTOLIC BLOOD PRESSURE: 113 MMHG

## 2021-07-19 VITALS — SYSTOLIC BLOOD PRESSURE: 90 MMHG | HEART RATE: 78 BPM | DIASTOLIC BLOOD PRESSURE: 53 MMHG

## 2021-07-19 DIAGNOSIS — Z98.890 OTHER SPECIFIED POSTPROCEDURAL STATES: Chronic | ICD-10-CM

## 2021-07-19 DIAGNOSIS — Z3A.00 WEEKS OF GESTATION OF PREGNANCY NOT SPECIFIED: ICD-10-CM

## 2021-07-19 DIAGNOSIS — O26.899 OTHER SPECIFIED PREGNANCY RELATED CONDITIONS, UNSPECIFIED TRIMESTER: ICD-10-CM

## 2021-07-19 PROCEDURE — 59025 FETAL NON-STRESS TEST: CPT | Mod: 26

## 2021-07-19 PROCEDURE — 99213 OFFICE O/P EST LOW 20 MIN: CPT | Mod: 25

## 2021-07-19 NOTE — OB PROVIDER TRIAGE NOTE - NSHPPHYSICALEXAM_GEN_ALL_CORE
SVE: 0.5/60/-3  abdomen: gravid, soft, nontender  FHT:  TOCO:  Vital Signs Last 24 Hrs  T(C): 36.6 (19 Jul 2021 10:58), Max: 36.6 (19 Jul 2021 10:58)  T(F): 97.9 (19 Jul 2021 10:58), Max: 97.9 (19 Jul 2021 10:58)  HR: 78 (19 Jul 2021 11:24) (78 - 87)  BP: 106/61 (19 Jul 2021 11:24) (106/61 - 113/65)  BP(mean): --  RR: 16 (19 Jul 2021 10:58) (16 - 16)  SpO2: -- SVE: 0.5/60/-3  abdomen: gravid, soft, nontender  FHT: category 1 tracing  TOCO: mild irregular contractions    Vital Signs Last 24 Hrs  T(C): 36.6 (19 Jul 2021 10:58), Max: 36.6 (19 Jul 2021 10:58)  T(F): 97.9 (19 Jul 2021 10:58), Max: 97.9 (19 Jul 2021 10:58)  HR: 78 (19 Jul 2021 11:24) (78 - 87)  BP: 106/61 (19 Jul 2021 11:24) (106/61 - 113/65)  BP(mean): --  RR: 16 (19 Jul 2021 10:58) (16 - 16)  SpO2: --

## 2021-07-19 NOTE — OB PROVIDER TRIAGE NOTE - NSOBPROVIDERNOTE_OBGYN_ALL_OB_FT
11:40 - ATU at bedside scanning patient 11:40 - ATU at bedside scanning patient    pt reports + FM now  reassuring maternal and fetal status  d/w MD Proctor  pt to be discharged home with general OB discharge instructions  daily kick counts reviewed  pt to follow up with OBGYN as scheduled

## 2021-07-19 NOTE — OB PROVIDER TRIAGE NOTE - PLAN OF CARE
pt reports + FM now  reassuring maternal and fetal status  d/w MD Proctor  pt to be discharged home with general OB discharge instructions  daily kick counts reviewed  pt to follow up with OBGYN as scheduled

## 2021-07-19 NOTE — OB PROVIDER TRIAGE NOTE - HISTORY OF PRESENT ILLNESS
31 y/o  at 39.1 weeks gestation c/o decreased FM since yesterday afternoon. Denies lof, vb.    AP course uncomplicated  NKDA  Current medications:  -PNV  OBGYN history:  -  6lbs 4oz  -  7lbs 1oz  -TOP (medicine, no D&C)  -h/o abnormal pap smear  Denies medical history  Surgical history:  -TASHA   -Lasik   -Bunionectomy  -polyp/cyst? L ovary

## 2021-07-21 ENCOUNTER — APPOINTMENT (OUTPATIENT)
Dept: OBGYN | Facility: CLINIC | Age: 31
End: 2021-07-21
Payer: MEDICAID

## 2021-07-21 PROCEDURE — 0502F SUBSEQUENT PRENATAL CARE: CPT

## 2021-07-26 ENCOUNTER — APPOINTMENT (OUTPATIENT)
Dept: ANTEPARTUM | Facility: CLINIC | Age: 31
End: 2021-07-26

## 2021-07-26 ENCOUNTER — TRANSCRIPTION ENCOUNTER (OUTPATIENT)
Age: 31
End: 2021-07-26

## 2021-07-26 ENCOUNTER — INPATIENT (INPATIENT)
Facility: HOSPITAL | Age: 31
LOS: 0 days | Discharge: HOME | End: 2021-07-27
Attending: OBSTETRICS & GYNECOLOGY | Admitting: OBSTETRICS & GYNECOLOGY
Payer: MEDICAID

## 2021-07-26 VITALS
HEART RATE: 77 BPM | RESPIRATION RATE: 16 BRPM | SYSTOLIC BLOOD PRESSURE: 116 MMHG | TEMPERATURE: 98 F | DIASTOLIC BLOOD PRESSURE: 67 MMHG

## 2021-07-26 DIAGNOSIS — Z3A.00 WEEKS OF GESTATION OF PREGNANCY NOT SPECIFIED: ICD-10-CM

## 2021-07-26 DIAGNOSIS — Z98.890 OTHER SPECIFIED POSTPROCEDURAL STATES: Chronic | ICD-10-CM

## 2021-07-26 DIAGNOSIS — Z33.2 ENCOUNTER FOR ELECTIVE TERMINATION OF PREGNANCY: Chronic | ICD-10-CM

## 2021-07-26 DIAGNOSIS — O26.899 OTHER SPECIFIED PREGNANCY RELATED CONDITIONS, UNSPECIFIED TRIMESTER: ICD-10-CM

## 2021-07-26 LAB
BASOPHILS # BLD AUTO: 0.02 K/UL — SIGNIFICANT CHANGE UP (ref 0–0.2)
BASOPHILS NFR BLD AUTO: 0.3 % — SIGNIFICANT CHANGE UP (ref 0–2)
BLD GP AB SCN SERPL QL: NEGATIVE — SIGNIFICANT CHANGE UP
COVID-19 SPIKE DOMAIN AB INTERP: POSITIVE
COVID-19 SPIKE DOMAIN ANTIBODY RESULT: 26.9 U/ML — HIGH
EOSINOPHIL # BLD AUTO: 0.08 K/UL — SIGNIFICANT CHANGE UP (ref 0–0.5)
EOSINOPHIL NFR BLD AUTO: 1.1 % — SIGNIFICANT CHANGE UP (ref 0–6)
HCT VFR BLD CALC: 40.1 % — SIGNIFICANT CHANGE UP (ref 34.5–45)
HGB BLD-MCNC: 12.8 G/DL — SIGNIFICANT CHANGE UP (ref 11.5–15.5)
IANC: 4.87 K/UL — SIGNIFICANT CHANGE UP (ref 1.5–8.5)
IMM GRANULOCYTES NFR BLD AUTO: 0.6 % — SIGNIFICANT CHANGE UP (ref 0–1.5)
LYMPHOCYTES # BLD AUTO: 1.49 K/UL — SIGNIFICANT CHANGE UP (ref 1–3.3)
LYMPHOCYTES # BLD AUTO: 21.3 % — SIGNIFICANT CHANGE UP (ref 13–44)
MCHC RBC-ENTMCNC: 26.3 PG — LOW (ref 27–34)
MCHC RBC-ENTMCNC: 31.9 GM/DL — LOW (ref 32–36)
MCV RBC AUTO: 82.5 FL — SIGNIFICANT CHANGE UP (ref 80–100)
MONOCYTES # BLD AUTO: 0.49 K/UL — SIGNIFICANT CHANGE UP (ref 0–0.9)
MONOCYTES NFR BLD AUTO: 7 % — SIGNIFICANT CHANGE UP (ref 2–14)
NEUTROPHILS # BLD AUTO: 4.87 K/UL — SIGNIFICANT CHANGE UP (ref 1.8–7.4)
NEUTROPHILS NFR BLD AUTO: 69.7 % — SIGNIFICANT CHANGE UP (ref 43–77)
NRBC # BLD: 0 /100 WBCS — SIGNIFICANT CHANGE UP
NRBC # FLD: 0 K/UL — SIGNIFICANT CHANGE UP
PLATELET # BLD AUTO: 222 K/UL — SIGNIFICANT CHANGE UP (ref 150–400)
RBC # BLD: 4.86 M/UL — SIGNIFICANT CHANGE UP (ref 3.8–5.2)
RBC # FLD: 13 % — SIGNIFICANT CHANGE UP (ref 10.3–14.5)
RH IG SCN BLD-IMP: POSITIVE — SIGNIFICANT CHANGE UP
SARS-COV-2 IGG+IGM SERPL QL IA: 26.9 U/ML — HIGH
SARS-COV-2 IGG+IGM SERPL QL IA: POSITIVE
SARS-COV-2 RNA SPEC QL NAA+PROBE: SIGNIFICANT CHANGE UP
T PALLIDUM AB TITR SER: NEGATIVE — SIGNIFICANT CHANGE UP
WBC # BLD: 6.99 K/UL — SIGNIFICANT CHANGE UP (ref 3.8–10.5)
WBC # FLD AUTO: 6.99 K/UL — SIGNIFICANT CHANGE UP (ref 3.8–10.5)

## 2021-07-26 PROCEDURE — 59400 OBSTETRICAL CARE: CPT | Mod: U9

## 2021-07-26 RX ORDER — IBUPROFEN 200 MG
600 TABLET ORAL EVERY 6 HOURS
Refills: 0 | Status: DISCONTINUED | OUTPATIENT
Start: 2021-07-26 | End: 2021-07-27

## 2021-07-26 RX ORDER — OXYTOCIN 10 UNIT/ML
333.33 VIAL (ML) INJECTION
Qty: 20 | Refills: 0 | Status: DISCONTINUED | OUTPATIENT
Start: 2021-07-26 | End: 2021-07-27

## 2021-07-26 RX ORDER — OXYCODONE HYDROCHLORIDE 5 MG/1
5 TABLET ORAL ONCE
Refills: 0 | Status: DISCONTINUED | OUTPATIENT
Start: 2021-07-26 | End: 2021-07-27

## 2021-07-26 RX ORDER — LANOLIN
1 OINTMENT (GRAM) TOPICAL EVERY 6 HOURS
Refills: 0 | Status: DISCONTINUED | OUTPATIENT
Start: 2021-07-26 | End: 2021-07-27

## 2021-07-26 RX ORDER — HYDROCORTISONE 1 %
1 OINTMENT (GRAM) TOPICAL EVERY 6 HOURS
Refills: 0 | Status: DISCONTINUED | OUTPATIENT
Start: 2021-07-26 | End: 2021-07-27

## 2021-07-26 RX ORDER — SODIUM CHLORIDE 9 MG/ML
1000 INJECTION, SOLUTION INTRAVENOUS ONCE
Refills: 0 | Status: DISCONTINUED | OUTPATIENT
Start: 2021-07-26 | End: 2021-07-26

## 2021-07-26 RX ORDER — ONDANSETRON 8 MG/1
4 TABLET, FILM COATED ORAL ONCE
Refills: 0 | Status: COMPLETED | OUTPATIENT
Start: 2021-07-26 | End: 2021-07-26

## 2021-07-26 RX ORDER — PRAMOXINE HYDROCHLORIDE 150 MG/15G
1 AEROSOL, FOAM RECTAL EVERY 4 HOURS
Refills: 0 | Status: DISCONTINUED | OUTPATIENT
Start: 2021-07-26 | End: 2021-07-27

## 2021-07-26 RX ORDER — BENZOCAINE 10 %
1 GEL (GRAM) MUCOUS MEMBRANE EVERY 6 HOURS
Refills: 0 | Status: DISCONTINUED | OUTPATIENT
Start: 2021-07-26 | End: 2021-07-27

## 2021-07-26 RX ORDER — DIPHENHYDRAMINE HCL 50 MG
25 CAPSULE ORAL EVERY 6 HOURS
Refills: 0 | Status: DISCONTINUED | OUTPATIENT
Start: 2021-07-26 | End: 2021-07-27

## 2021-07-26 RX ORDER — SODIUM CHLORIDE 9 MG/ML
1000 INJECTION, SOLUTION INTRAVENOUS
Refills: 0 | Status: DISCONTINUED | OUTPATIENT
Start: 2021-07-26 | End: 2021-07-26

## 2021-07-26 RX ORDER — OXYCODONE HYDROCHLORIDE 5 MG/1
5 TABLET ORAL
Refills: 0 | Status: DISCONTINUED | OUTPATIENT
Start: 2021-07-26 | End: 2021-07-27

## 2021-07-26 RX ORDER — KETOROLAC TROMETHAMINE 30 MG/ML
30 SYRINGE (ML) INJECTION ONCE
Refills: 0 | Status: DISCONTINUED | OUTPATIENT
Start: 2021-07-26 | End: 2021-07-26

## 2021-07-26 RX ORDER — SODIUM CHLORIDE 9 MG/ML
3 INJECTION INTRAMUSCULAR; INTRAVENOUS; SUBCUTANEOUS EVERY 8 HOURS
Refills: 0 | Status: DISCONTINUED | OUTPATIENT
Start: 2021-07-26 | End: 2021-07-27

## 2021-07-26 RX ORDER — TETANUS TOXOID, REDUCED DIPHTHERIA TOXOID AND ACELLULAR PERTUSSIS VACCINE, ADSORBED 5; 2.5; 8; 8; 2.5 [IU]/.5ML; [IU]/.5ML; UG/.5ML; UG/.5ML; UG/.5ML
0.5 SUSPENSION INTRAMUSCULAR ONCE
Refills: 0 | Status: DISCONTINUED | OUTPATIENT
Start: 2021-07-26 | End: 2021-07-27

## 2021-07-26 RX ORDER — DIBUCAINE 1 %
1 OINTMENT (GRAM) RECTAL EVERY 6 HOURS
Refills: 0 | Status: DISCONTINUED | OUTPATIENT
Start: 2021-07-26 | End: 2021-07-27

## 2021-07-26 RX ORDER — IBUPROFEN 200 MG
600 TABLET ORAL EVERY 6 HOURS
Refills: 0 | Status: COMPLETED | OUTPATIENT
Start: 2021-07-26 | End: 2022-06-24

## 2021-07-26 RX ORDER — AER TRAVELER 0.5 G/1
1 SOLUTION RECTAL; TOPICAL EVERY 4 HOURS
Refills: 0 | Status: DISCONTINUED | OUTPATIENT
Start: 2021-07-26 | End: 2021-07-27

## 2021-07-26 RX ORDER — ACETAMINOPHEN 500 MG
975 TABLET ORAL
Refills: 0 | Status: DISCONTINUED | OUTPATIENT
Start: 2021-07-26 | End: 2021-07-27

## 2021-07-26 RX ORDER — MAGNESIUM HYDROXIDE 400 MG/1
30 TABLET, CHEWABLE ORAL
Refills: 0 | Status: DISCONTINUED | OUTPATIENT
Start: 2021-07-26 | End: 2021-07-27

## 2021-07-26 RX ORDER — SIMETHICONE 80 MG/1
80 TABLET, CHEWABLE ORAL EVERY 4 HOURS
Refills: 0 | Status: DISCONTINUED | OUTPATIENT
Start: 2021-07-26 | End: 2021-07-27

## 2021-07-26 RX ADMIN — SODIUM CHLORIDE 3 MILLILITER(S): 9 INJECTION INTRAMUSCULAR; INTRAVENOUS; SUBCUTANEOUS at 20:44

## 2021-07-26 RX ADMIN — SODIUM CHLORIDE 125 MILLILITER(S): 9 INJECTION, SOLUTION INTRAVENOUS at 13:38

## 2021-07-26 RX ADMIN — SODIUM CHLORIDE 1000 MILLILITER(S): 9 INJECTION, SOLUTION INTRAVENOUS at 13:00

## 2021-07-26 RX ADMIN — Medication 1000 MILLIUNIT(S)/MIN: at 15:45

## 2021-07-26 RX ADMIN — Medication 975 MILLIGRAM(S): at 21:30

## 2021-07-26 RX ADMIN — Medication 975 MILLIGRAM(S): at 20:48

## 2021-07-26 RX ADMIN — ONDANSETRON 4 MILLIGRAM(S): 8 TABLET, FILM COATED ORAL at 14:03

## 2021-07-26 RX ADMIN — Medication 30 MILLIGRAM(S): at 17:10

## 2021-07-26 NOTE — PROVIDER CONTACT NOTE (OTHER) - ACTION/TREATMENT ORDERED:
Bedside evaluation, bedside sono complete and reviewed by MD Ochoa and MD Campo, no new orders at this time. Will continue to monitor

## 2021-07-26 NOTE — OB RN DELIVERY SUMMARY - NSSELHIDDEN_OBGYN_ALL_OB_FT
[NS_DeliveryAttending1_OBGYN_ALL_OB_FT:[id number]],[NS_DeliveryRN_OBGYN_ALL_OB_FT:[id number]=]

## 2021-07-26 NOTE — OB PROVIDER TRIAGE NOTE - NSOBPROVIDERNOTE_OBGYN_ALL_OB_FT
30y  at 40.1w in labor, intact membranes  GBS negative     Admit to L&D  Routine labs  Continuous EFM/toco  Expectant management  Covid swabbed x2  Consents obtained  Epidural PRN    d/w Hitesh BHARDWAJ

## 2021-07-26 NOTE — DISCHARGE NOTE OB - MEDICATION SUMMARY - MEDICATIONS TO TAKE
I will START or STAY ON the medications listed below when I get home from the hospital:    ibuprofen 600 mg oral tablet  -- 1 tab(s) by mouth every 6 hours  -- Indication: For Normal vaginal delivery

## 2021-07-26 NOTE — DISCHARGE NOTE OB - PATIENT PORTAL LINK FT
You can access the FollowMyHealth Patient Portal offered by [facility] by registering at the following website: [email or web address]. By joining [employer]’s FollowMyHealth portal, you will also be able to view your health information using other applications (apps) compatible with our system.

## 2021-07-26 NOTE — OB PROVIDER TRIAGE NOTE - NS_OBGYNHISTORY_OBGYN_ALL_OB_FT
10/24/2017 FT nsd 6#4 M  2020 FT nsd 7#1 F    GYN hx: +ovarian cysts, abnl pap s/p LEEP. Denies hx of fibroids or STIs

## 2021-07-26 NOTE — OB RN TRIAGE NOTE - PSH
H/O foot surgery    H/O LEEP    History of eye surgery  2010 lasik  Termination of pregnancy (fetus)

## 2021-07-26 NOTE — OB PROVIDER TRIAGE NOTE - HISTORY OF PRESENT ILLNESS
PNC: Benita    This is a 29yo  at 40.1w (EDC 21) presents to triage with c/o contractions q5 mins. Appreciates good fetal movement, denies leakage of fluid or vaginal bleeding.   Covid recovered in 2021, denies recent covid exposures or covid s/s.    AP complications:  +Covid in pregnancy

## 2021-07-26 NOTE — OB RN PATIENT PROFILE - POST PARTUM DEPRESSION SCREEN OB 3
Patient presents for f/u with c/o bloating/gas/diarrhea/constipation for years. Nothing has worked over the years. Patient admits to drinking multiple sodas/day starting in the morning. Patient also admits to dysphagia. Pt's recent EGD revealed decreased esophageal peristalsis. Pt had a hiatal repair 1 yr ago. Review of UGI series and GES revealed dialted esophagus with ? stricture at the GEJ and moderate delay in gastric emptying.
no

## 2021-07-26 NOTE — OB PROVIDER TRIAGE NOTE - NSHPPHYSICALEXAM_GEN_ALL_CORE
T(C): 36.5 (07-26-21 @ 11:49), Max: 36.5 (07-26-21 @ 11:27)  HR: 77 (07-26-21 @ 11:51) (77 - 77)  BP: 116/64 (07-26-21 @ 11:51) (116/64 - 116/67)  RR: 16 (07-26-21 @ 11:27) (16 - 16)  SpO2: --    General: A&Ox3, appropriate, NAD  Cardiac: Regular rate and rhythm  Resp: CTAB  Abd: Gravid, soft, nontender  SVE: 5/90/ bulging bag  EFM: 150, moderate variability, +accel, +variable, Cat 2  [place]: q 5-6 min  TAS: vtx

## 2021-07-26 NOTE — DISCHARGE NOTE OB - CARE PROVIDER_API CALL
Guanako Joy)  MaternalFetal Medicine; Obstetrics and Gynecology  [address] [place] [address]  Phone: [phone]  Fax: [phone]  Established Patient  Follow Up Time:

## 2021-07-26 NOTE — OB RN PATIENT PROFILE - NS_CONTRACTPATTER_OBGYN_ALL_OB
[Alert] : alert [Well Nourished] : well nourished [No Acute Distress] : no acute distress [Well Developed] : well developed [Normal Sclera/Conjunctiva] : normal sclera/conjunctiva [EOMI] : extra ocular movement intact [No Proptosis] : no proptosis [Normal Oropharynx] : the oropharynx was normal [Thyroid Not Enlarged] : the thyroid was not enlarged [No Thyroid Nodules] : no palpable thyroid nodules [No Respiratory Distress] : no respiratory distress [No Accessory Muscle Use] : no accessory muscle use [Clear to Auscultation] : lungs were clear to auscultation bilaterally [Normal S1, S2] : normal S1 and S2 [Normal Rate] : heart rate was normal [Regular Rhythm] : with a regular rhythm [No Edema] : no peripheral edema [Pedal Pulses Normal] : the pedal pulses are present [Normal Bowel Sounds] : normal bowel sounds [Not Tender] : non-tender [Not Distended] : not distended [Soft] : abdomen soft [Normal Anterior Cervical Nodes] : no anterior cervical lymphadenopathy [Normal Posterior Cervical Nodes] : no posterior cervical lymphadenopathy [No Spinal Tenderness] : no spinal tenderness [Spine Straight] : spine straight [No Stigmata of Cushings Syndrome] : no stigmata of Cushings Syndrome [Normal Gait] : normal gait [Normal Strength/Tone] : muscle strength and tone were normal [No Rash] : no rash [Normal Reflexes] : deep tendon reflexes were 2+ and symmetric [No Tremors] : no tremors [Oriented x3] : oriented to person, place, and time [Acanthosis Nigricans] : no acanthosis nigricans Regular

## 2021-07-26 NOTE — OB RN DELIVERY SUMMARY - NS_SEPSISRSKCALC_OBGYN_ALL_OB_FT
EOS calculated successfully. EOS Risk Factor: 0.5/1000 live births ([facility] national incidence); GA=40w1d; Temp=97.7; ROM=0.2; GBS='Negative'; Antibiotics='No antibiotics or any antibiotics < 2 hrs prior to birth'

## 2021-07-26 NOTE — OB RN TRIAGE NOTE - PMH
Normal vaginal delivery  2017   Normal vaginal delivery  2020  Normal vaginal delivery  2017  Ovarian cyst

## 2021-07-26 NOTE — DISCHARGE NOTE OB - CARE PLAN
Principal Discharge DX:	Normal vaginal delivery  Goal:	fully recovered  Assessment and plan of treatment:	dr casanova 6 weeks

## 2021-07-27 VITALS
RESPIRATION RATE: 16 BRPM | OXYGEN SATURATION: 100 % | HEART RATE: 56 BPM | DIASTOLIC BLOOD PRESSURE: 66 MMHG | TEMPERATURE: 98 F | SYSTOLIC BLOOD PRESSURE: 108 MMHG

## 2021-07-27 RX ORDER — IBUPROFEN 200 MG
1 TABLET ORAL
Qty: 0 | Refills: 0 | DISCHARGE
Start: 2021-07-27

## 2021-07-27 RX ADMIN — Medication 600 MILLIGRAM(S): at 06:16

## 2021-07-27 RX ADMIN — Medication 975 MILLIGRAM(S): at 09:30

## 2021-07-27 RX ADMIN — Medication 975 MILLIGRAM(S): at 03:02

## 2021-07-27 RX ADMIN — SODIUM CHLORIDE 3 MILLILITER(S): 9 INJECTION INTRAMUSCULAR; INTRAVENOUS; SUBCUTANEOUS at 06:11

## 2021-07-27 RX ADMIN — Medication 600 MILLIGRAM(S): at 06:39

## 2021-07-27 RX ADMIN — Medication 975 MILLIGRAM(S): at 08:40

## 2021-07-27 RX ADMIN — Medication 975 MILLIGRAM(S): at 17:14

## 2021-07-27 RX ADMIN — Medication 600 MILLIGRAM(S): at 14:30

## 2021-07-27 RX ADMIN — Medication 600 MILLIGRAM(S): at 01:00

## 2021-07-27 RX ADMIN — Medication 600 MILLIGRAM(S): at 00:11

## 2021-07-27 RX ADMIN — Medication 600 MILLIGRAM(S): at 13:33

## 2021-07-27 RX ADMIN — Medication 975 MILLIGRAM(S): at 02:45

## 2021-07-27 RX ADMIN — Medication 975 MILLIGRAM(S): at 17:51

## 2021-07-27 NOTE — PROGRESS NOTE ADULT - SUBJECTIVE AND OBJECTIVE BOX
Day  1  Vaginal Delivery    She feels well. Pain is well controlled. Minimal vaginal bleeding.  T(C): 36.5 (21 @ 05:47), Max: 37.2 (21 @ 18:57)  HR: 56 (21 @ 05:47) (56 - 106)  BP: 107/63 (21 @ 05:47) (97/59 - 117/72)  RR: --  SpO2: 100% (21 @ 05:47) (91% - 100%)  Wt(kg): --  Vital signs stable    Exam   Abdomen - soft, nondistended, appropriately tender, fundus firm  Ext - no calf tenderness        Assessment and Plan  Postpartum day #1 s/p  - stable  Continue current pain medication  Encourage  Ambulation  Encourage regular diet  DVT ppx: SCDs only when not ambulating  She will be discharged home today according to current pandemic criteria

## 2021-08-26 DIAGNOSIS — Z78.9 OTHER SPECIFIED HEALTH STATUS: ICD-10-CM

## 2021-08-26 RX ORDER — MULTI-VITAMIN/MINERAL SUPPLEMENT WITH SODIUM ASCORBATE, CHOLECALCIFEROL, DI-ALPHA-TOCOPHERYL ACETATE, THIAMINE MONONITRATE, RIBOFLAVIN, NIACINAMIDE, PYRIDOXINE HCL, FOLIC ACID, CYANOCOBALAMIN, CALCIUM FORMATE, CALCIUM CARBONATE, FERROUS (II) BIS-GLYCINATE CHELATE, POTASSIUM IODIDE, ZINC OXIDE, AND CHOLINE BITARTRATE 50; 155; 45; 32; 55; 30; 3; 1; 20; 10; 100; 10; 120; 3; 450 MG/1; MG/1; MG/1; MG/1; MG/1; [IU]/1; MG/1; MG/1; MG/1; UG/1; UG/1; MG/1; MG/1; MG/1; [IU]/1
32-1 TABLET, FILM COATED ORAL
Refills: 0 | Status: ACTIVE | COMMUNITY

## 2021-09-08 ENCOUNTER — APPOINTMENT (OUTPATIENT)
Dept: OBGYN | Facility: CLINIC | Age: 31
End: 2021-09-08
Payer: MEDICAID

## 2021-09-08 VITALS
BODY MASS INDEX: 26.16 KG/M2 | WEIGHT: 157 LBS | HEIGHT: 65 IN | SYSTOLIC BLOOD PRESSURE: 114 MMHG | DIASTOLIC BLOOD PRESSURE: 74 MMHG

## 2021-09-08 PROCEDURE — 0502F SUBSEQUENT PRENATAL CARE: CPT

## 2021-09-08 RX ORDER — ETONOGESTREL AND ETHINYL ESTRADIOL 11.7; 2.7 MG/1; MG/1
0.12-0.015 INSERT, EXTENDED RELEASE VAGINAL
Qty: 1 | Refills: 3 | Status: ACTIVE | COMMUNITY
Start: 2021-09-08 | End: 1900-01-01

## 2021-09-08 NOTE — HISTORY OF PRESENT ILLNESS
[FreeTextEntry1] : 29 y/o F s/p  on  presenting for postpartum visit. Liveborn male. She is bottlefeeding. She is feeling well and is without complaints. Her mood is stable.\par

## 2021-09-08 NOTE — PLAN
[FreeTextEntry1] : 31 y/o F presenting for 6 week PP visit\par -normal PP exam\par -Patient cleared to resume intercourse and exercise\par -Patient counseled on contraception options, desires tubal ligation. Will start nuvaring until surgery is scheduled.\par -f/u for consultation for t/l

## 2021-09-16 ENCOUNTER — APPOINTMENT (OUTPATIENT)
Dept: OBGYN | Facility: CLINIC | Age: 31
End: 2021-09-16
Payer: MEDICAID

## 2021-09-16 VITALS — SYSTOLIC BLOOD PRESSURE: 117 MMHG | BODY MASS INDEX: 26.46 KG/M2 | DIASTOLIC BLOOD PRESSURE: 83 MMHG | WEIGHT: 159 LBS

## 2021-09-16 DIAGNOSIS — Z30.09 ENCOUNTER FOR OTHER GENERAL COUNSELING AND ADVICE ON CONTRACEPTION: ICD-10-CM

## 2021-09-16 PROCEDURE — 99213 OFFICE O/P EST LOW 20 MIN: CPT

## 2021-09-16 NOTE — PHYSICAL EXAM
[Appropriately responsive] : appropriately responsive [Alert] : alert [No Acute Distress] : no acute distress [No Lymphadenopathy] : no lymphadenopathy [Soft] : soft [Non-tender] : non-tender [Non-distended] : non-distended [No HSM] : No HSM [No Lesions] : no lesions [No Mass] : no mass [Oriented x3] : oriented x3

## 2021-09-16 NOTE — HISTORY OF PRESENT ILLNESS
[FreeTextEntry1] : Patient is a 31 y/o P3 presenting for consultation for permanent sterilization. \par \par The patient was counseled on the option of long-acting reversible contraception and the patient desires permanent contraception. The patient was counseled on all methods of sterilization including laparoscopic salpingectomy and vasectomy. She voiced understanding that these methods are not reversible and that she will not be able to become pregnant without ART after the procedure is performed. The patient was counseled on the risk of regret with data from the CREST study indicating that this risk is approximately 20% in women less than age 30, and 6% in women older than 30. \par \par The patient was counseled on the risks and benefits of laparoscopic salpingectomy including but not limited to  bleeding, infection, conversion to laparotomy and damage to the abdomino-pelvic contents including the blood vessels, nerves, bowel, bladder, ureters, uterus and ovaries. She was informed that while highly effective, there is a small chance of failure and informed of the need to inform a clinician immediately if she has signs or symptoms of pregnancy as this may indicate an ectopic pregnancy. She was informed that the risk of failure increases with further time from the initial sterilization procedure. The patient was counseled that salpingectomy has the added benefit of prevention of tubal epithelial cancers. The patient expressed an understanding of the treatment rationale and her questions were answered to her apparent satisfaction. After discussion of the risks/benefits and alternatives, the patient requests to proceed with permanent sterilization. \par \par OBHx:  x3, most recently 21\par GYNHx: abnl pap 2013, s/p TASHA [PapSmeardate] : 1/2021 [TextBox_31] : IRINA

## 2021-09-16 NOTE — DISCUSSION/SUMMARY
[FreeTextEntry1] : 31 y/o F presenting for counseling for sterilization\par - Patient to be scheduled for laparoscopic salpingectomy\par - Sterilization consents signed today\par -Risks (bleeding, infection, damage to surrounding structures including bladder, bowel, blood vessels), benefits and alternatives discussed as above\par - All questions/concerns addressed\par -PST and covid testing to be scheduled \par - Discussed need for contraception until sterilization procedure, patient will use Nuva RIng

## 2021-09-30 ENCOUNTER — OUTPATIENT (OUTPATIENT)
Dept: OUTPATIENT SERVICES | Facility: HOSPITAL | Age: 31
LOS: 1 days | End: 2021-09-30

## 2021-09-30 VITALS
OXYGEN SATURATION: 99 % | TEMPERATURE: 99 F | HEART RATE: 72 BPM | WEIGHT: 160.06 LBS | SYSTOLIC BLOOD PRESSURE: 111 MMHG | HEIGHT: 65 IN | DIASTOLIC BLOOD PRESSURE: 71 MMHG | RESPIRATION RATE: 16 BRPM

## 2021-09-30 DIAGNOSIS — Z64.1 PROBLEMS RELATED TO MULTIPARITY: ICD-10-CM

## 2021-09-30 DIAGNOSIS — Z98.890 OTHER SPECIFIED POSTPROCEDURAL STATES: Chronic | ICD-10-CM

## 2021-09-30 DIAGNOSIS — Z33.2 ENCOUNTER FOR ELECTIVE TERMINATION OF PREGNANCY: Chronic | ICD-10-CM

## 2021-09-30 LAB
BLD GP AB SCN SERPL QL: NEGATIVE — SIGNIFICANT CHANGE UP
HCG UR QL: NEGATIVE — SIGNIFICANT CHANGE UP
HCT VFR BLD CALC: 42.1 % — SIGNIFICANT CHANGE UP (ref 34.5–45)
HGB BLD-MCNC: 13.5 G/DL — SIGNIFICANT CHANGE UP (ref 11.5–15.5)
MCHC RBC-ENTMCNC: 26.5 PG — LOW (ref 27–34)
MCHC RBC-ENTMCNC: 32.1 GM/DL — SIGNIFICANT CHANGE UP (ref 32–36)
MCV RBC AUTO: 82.5 FL — SIGNIFICANT CHANGE UP (ref 80–100)
NRBC # BLD: 0 /100 WBCS — SIGNIFICANT CHANGE UP
NRBC # FLD: 0 K/UL — SIGNIFICANT CHANGE UP
PLATELET # BLD AUTO: 287 K/UL — SIGNIFICANT CHANGE UP (ref 150–400)
RBC # BLD: 5.1 M/UL — SIGNIFICANT CHANGE UP (ref 3.8–5.2)
RBC # FLD: 13.2 % — SIGNIFICANT CHANGE UP (ref 10.3–14.5)
RH IG SCN BLD-IMP: POSITIVE — SIGNIFICANT CHANGE UP
WBC # BLD: 4.5 K/UL — SIGNIFICANT CHANGE UP (ref 3.8–10.5)
WBC # FLD AUTO: 4.5 K/UL — SIGNIFICANT CHANGE UP (ref 3.8–10.5)

## 2021-09-30 RX ORDER — SODIUM CHLORIDE 9 MG/ML
1000 INJECTION, SOLUTION INTRAVENOUS
Refills: 0 | Status: DISCONTINUED | OUTPATIENT
Start: 2021-10-12 | End: 2021-10-26

## 2021-09-30 NOTE — H&P PST ADULT - PROBLEM SELECTOR PLAN 1
Patient tentatively scheduled for  laparoscopic bilateral salpingectomy on 10/12/21.  Pre-op instructions provided. Pt given verbal and written instructions with teach back on chlorhexidine shampoo and pepcid. Pt verbalized understanding with return demonstration.   Covid testing scheduled prior to surgery as per patient.   Urine cup provided for day of procedure for pregnancy test.

## 2021-09-30 NOTE — H&P PST ADULT - NSICDXPASTSURGICALHX_GEN_ALL_CORE_FT
PAST SURGICAL HISTORY:  H/O foot surgery     H/O LEEP     History of eye surgery 2010 lasik    Termination of pregnancy (fetus)

## 2021-09-30 NOTE — H&P PST ADULT - HISTORY OF PRESENT ILLNESS
30 year old female with no  PMH  presents to Presurgical testing with diagnosis of problems related to multiparity scheduled for laparoscopic bilateral salpingectomy.

## 2021-10-11 ENCOUNTER — APPOINTMENT (OUTPATIENT)
Dept: OBGYN | Facility: CLINIC | Age: 31
End: 2021-10-11
Payer: MEDICAID

## 2021-10-11 ENCOUNTER — TRANSCRIPTION ENCOUNTER (OUTPATIENT)
Age: 31
End: 2021-10-11

## 2021-10-11 DIAGNOSIS — Z11.52 ENCOUNTER FOR SCREENING FOR COVID-19: ICD-10-CM

## 2021-10-11 LAB — SARS-COV-2 N GENE NPH QL NAA+PROBE: NOT DETECTED

## 2021-10-11 PROCEDURE — ZZZZZ: CPT

## 2021-10-12 ENCOUNTER — RESULT REVIEW (OUTPATIENT)
Age: 31
End: 2021-10-12

## 2021-10-12 ENCOUNTER — OUTPATIENT (OUTPATIENT)
Dept: OUTPATIENT SERVICES | Facility: HOSPITAL | Age: 31
LOS: 1 days | Discharge: HOME | End: 2021-10-12
Payer: MEDICAID

## 2021-10-12 VITALS
OXYGEN SATURATION: 99 % | HEIGHT: 65 IN | TEMPERATURE: 98 F | HEART RATE: 67 BPM | DIASTOLIC BLOOD PRESSURE: 73 MMHG | WEIGHT: 160.94 LBS | SYSTOLIC BLOOD PRESSURE: 111 MMHG | RESPIRATION RATE: 16 BRPM

## 2021-10-12 VITALS
RESPIRATION RATE: 18 BRPM | SYSTOLIC BLOOD PRESSURE: 112 MMHG | DIASTOLIC BLOOD PRESSURE: 68 MMHG | TEMPERATURE: 99 F | OXYGEN SATURATION: 99 % | HEART RATE: 70 BPM

## 2021-10-12 DIAGNOSIS — Z64.1 PROBLEMS RELATED TO MULTIPARITY: ICD-10-CM

## 2021-10-12 DIAGNOSIS — Z33.2 ENCOUNTER FOR ELECTIVE TERMINATION OF PREGNANCY: Chronic | ICD-10-CM

## 2021-10-12 DIAGNOSIS — Z98.890 OTHER SPECIFIED POSTPROCEDURAL STATES: Chronic | ICD-10-CM

## 2021-10-12 LAB — HCG UR QL: NEGATIVE — SIGNIFICANT CHANGE UP

## 2021-10-12 PROCEDURE — 88302 TISSUE EXAM BY PATHOLOGIST: CPT | Mod: 26

## 2021-10-12 PROCEDURE — 58661 LAPAROSCOPY REMOVE ADNEXA: CPT

## 2021-10-12 RX ORDER — OXYCODONE HYDROCHLORIDE 5 MG/1
1 TABLET ORAL
Qty: 12 | Refills: 0
Start: 2021-10-12

## 2021-10-12 RX ORDER — ONDANSETRON 8 MG/1
4 TABLET, FILM COATED ORAL ONCE
Refills: 0 | Status: DISCONTINUED | OUTPATIENT
Start: 2021-10-12 | End: 2021-10-26

## 2021-10-12 RX ORDER — FENTANYL CITRATE 50 UG/ML
50 INJECTION INTRAVENOUS
Refills: 0 | Status: DISCONTINUED | OUTPATIENT
Start: 2021-10-12 | End: 2021-10-12

## 2021-10-12 RX ADMIN — FENTANYL CITRATE 50 MICROGRAM(S): 50 INJECTION INTRAVENOUS at 15:37

## 2021-10-12 NOTE — ASU DISCHARGE PLAN (ADULT/PEDIATRIC) - ASU DC SPECIAL INSTRUCTIONSFT
Return to your regular way of eating.  Resume normal activity as tolerated, but no heavy lifting or strenuous activity for 2 weeks.  No driving for next 2 weeks and/or while on narcotic pain medication.  Complete vaginal rest, no tampons, no douching, no tub bathing, no sexual activities for 2 weeks unless otherwise instructed by your doctor.  Call your doctor with any signs and symptoms of infection such as fever, chills, nausea or vomiting.  Call your doctor with redness or swelling at the incision site, fluid leakage or wound separation.  Call your doctor if you're unable to tolerate food or have difficulty urinating.  Call your doctor if you have pain that is not relieved by your prescribed medications.  Notify your doctor with any other concerns.  Follow up with Dr. Headley in 2 weeks.

## 2021-10-12 NOTE — ASU DISCHARGE PLAN (ADULT/PEDIATRIC) - CARE PROVIDER_API CALL
Megan Ivan; MPH)  Benita KNOWLES  [address], [address]  [place] [address]  Phone: [phone]  Fax: [phone]  Established Patient  Follow Up Time: 2 weeks

## 2021-10-12 NOTE — BRIEF OPERATIVE NOTE - OPERATION/FINDINGS
Exam under anesthesia revealed a normal week sized mobile uterus. Vagina and cervix appeared normal.   Intraabdominal findings: grossly normal ovaries and fallopian tubes bilaterally. uterus enlarged and smooth with no exophytic masses. normal upper abdominal survey with smooth liver edge, appendix visualized/normal, normal omentum. Exam under anesthesia revealed a normal week sized mobile uterus. Vagina and cervix appeared normal.   Intraabdominal findings: grossly normal uterus, ovaries and fallopian tubes bilaterally. normal upper abdominal survey with smooth liver edge, appendix visualized/normal, normal omentum.

## 2021-10-12 NOTE — ASU DISCHARGE PLAN (ADULT/PEDIATRIC) - NURSING INSTRUCTIONS
Last dose of TYLENOL for pain management was at ________ . Next dose of TYLENOL may be taken at or after _______ if needed. DO NOT take any additional products containing TYLENOL or ACETAMINOPHEN, such as VICODIN, PERCOCET, NORCO, EXCEDRIN, and any over-the-counter cold medications. DO NOT CONSUME MORE THAN 4066-1348 MG OF TYLENOL (acetaminophen) in a 24-hour period.   Next dose of NDAIDS (ibuprofen, motrin, advil, naproxen, aleve, or aspirin) may be taken at or after ________ PM if needed.

## 2021-10-12 NOTE — PACU DISCHARGE NOTE - MENTAL STATUS: PATIENT PARTICIPATION
Health Maintenance Summary     Topic Due On Due Status Completed On    PAP SMEAR - CERVICAL CANCER SCREENING Mar 22, 2020 Not Due Mar 22, 2017    MAMMOGRAM - BREAST CANCER SCREENING Apr 19, 2018 Not Due Apr 19, 2017    Immunization - TDAP Pregnancy  Hidden     IMMUNIZATION - DTaP/Tdap/Td May 24, 2019 Not Due May 24, 2009    Immunization-Influenza  Completed Sep 22, 2017    Depression Screening Mar 23, 2019 Not Due Mar 23, 2018          Patient is up to date, no discussion needed.    Recent PHQ 2/9 Score    PHQ 2:  Date PHQ 2 Score   3/23/2018 0       PHQ 9:       Over the last 2 weeks, how often have you been bothered by the following problems?         PHQ2 Score:  0  1. Little interest or pleasure in doing things?:  Not at all  2. Feeling down, depressed, or hopeless?:  Not at all                    Awake

## 2021-10-15 PROBLEM — Z78.9 OTHER SPECIFIED HEALTH STATUS: Chronic | Status: ACTIVE | Noted: 2021-09-30

## 2021-10-18 ENCOUNTER — APPOINTMENT (OUTPATIENT)
Dept: OBGYN | Facility: CLINIC | Age: 31
End: 2021-10-18
Payer: MEDICAID

## 2021-10-18 VITALS — SYSTOLIC BLOOD PRESSURE: 127 MMHG | WEIGHT: 165 LBS | BODY MASS INDEX: 27.46 KG/M2 | DIASTOLIC BLOOD PRESSURE: 84 MMHG

## 2021-10-18 DIAGNOSIS — Z87.898 PERSONAL HISTORY OF OTHER SPECIFIED CONDITIONS: ICD-10-CM

## 2021-10-18 PROCEDURE — 99024 POSTOP FOLLOW-UP VISIT: CPT

## 2021-10-18 PROCEDURE — 99213 OFFICE O/P EST LOW 20 MIN: CPT

## 2021-10-18 NOTE — PLAN
[FreeTextEntry1] : 29 y/o F presenting for post operative check\par -Patient recovering well\par -f/u in January for annual visit\par

## 2021-10-18 NOTE — PHYSICAL EXAM
[Appropriately responsive] : appropriately responsive [Soft] : soft [Non-tender] : non-tender [Non-distended] : non-distended [No HSM] : No HSM [No Lesions] : no lesions [No Mass] : no mass [Oriented x3] : oriented x3 [FreeTextEntry7] : incisions intact

## 2021-10-18 NOTE — HISTORY OF PRESENT ILLNESS
[FreeTextEntry1] : Patient is a 29 y/o F s/p labrascopic bilateral tubal ligation on 10/12/21 for sterilization. Patient is feeling well today. Pain well controlled, ambulating, voiding, tolerating PO. Denies subjective fevers and chills. \par \par

## 2021-10-28 LAB — SURGICAL PATHOLOGY STUDY: SIGNIFICANT CHANGE UP

## 2022-02-08 NOTE — ASU PREOP CHECKLIST - SITE MARKED BY ANESTHESIOLOGIST
Referring Provider: Mindy Redman DNP      Lung Cancer Risk Profile:   Age: 52  Gender: Female  Height: 64\"  Weight: 157#    Smoking History:  Smoking Status: past use  # years smokin  # years quit: 2  Packs/day: 1  Pack years: 28    Patient discussed smoking cessation with PCP: Yes, per provider order    Patient participated in shared decision making process with PCP: Yes, per provider order    Patient is currently experiencing symptoms: No    If yes what symptoms:     Co-Morbidities:      Cancer History:      Additional Risk Factors:        Patient's smoking history verified via provider note. Patient meets LDCT lung cancer screening criteria.        KRISTEN HamN, RN, OCN  Lung Health Nurse Navigator n/a

## 2022-03-01 NOTE — OB PROVIDER H&P - NS_EFW_OBGYN_ALL_OB_FT
0104 Topical Ketoconazole Counseling: Patient counseled that this medication may cause skin irritation or allergic reactions.  In the event of skin irritation, the patient was advised to reduce the amount of the drug applied or use it less frequently.   The patient verbalized understanding of the proper use and possible adverse effects of ketoconazole.  All of the patient's questions and concerns were addressed.

## 2022-04-28 NOTE — DISCHARGE NOTE OB - REASON FOR ADMISSION
PRISCA reached out to Jaclyn re: yesterday's back injury to see if desires further care. Jaclyn declines care at this time but was receptive to writing down [place] Call center phone number.   iup at term labor

## 2022-09-06 NOTE — H&P PST ADULT - NSANTHTIREDRD_ENT_A_CORE
"MD Notification    Notified Person: MD    Notified Person Name: Alpa    Notification Date/Time: 09/06/22 11:09 AM    Notification Interaction: Amcom    Purpose of Notification: \"Pts groin/inner thighs are red and irritated. Can we get some powder for him? Thank you.\"    Orders Received: Miconazole added to MAR    Comments:      "
Dr Malagon paged for    pt's wife requesting telephone update. Wife is also wondering if we need to r/o infection such as UTI since pt is having increased confusion and agitation.      Awaiting return call:  
MD Notification    Notified Person: MD    Notified Person Name: Dr. Muñiz    Notification Date/Time: 12;20am 9/2/22     Notification Interaction: text page    Purpose of Notification: FYI RT was unable to get stat ABG lab due to pt jerking arm away and around. Attempted to hold arm down for RT but pt still jerking too much. Writer wary of giving sedative d/t lethargy that has been present all day.    Orders Received:    Comments:    
MD Notification    Notified Person: MD    Notified Person Name: En Vesna CANDELARIA     Notification Date/Time: 703am 8/25/22    Notification Interaction: web based page    Purpose of Notification: Pt is confused and agitated and still needs order for hard restraints following repeated escalation of agitation and combative behavior including kicking, hitting, and biting attempts by pt. Pt initially in soft wrist restraints but was able to get out of them through agitation. Repeated and frequent attempts to redirect patient and reorient pt throughout shift. Pt was given PRN zyprexa with no change in agitation. Charge nurse attempted to contact and receive direction from another provider regarding behavior during incident but did not receive call back or orders. No call back yet received from any provider regarding incident.    Orders Received:    Comments:    
MD Notification    Notified Person: MD    Notified Person Name:Dr. Membreno    Notification Date/Time:9/2/22 1731    Notification Interaction:[id number], Just an FYI pt's K came back 3.3. Thank you.    Purpose of Notification:    Orders Received:    Comments:    
Notified Person:  Denise MD    Notification Date/Time:  8/24/22 @ 0520     Notification Interaction: amcom    Purpose of Notification: HgB 6.4, need conditional blood transfusion orders    Orders Received: orders provided    
No

## 2023-01-10 NOTE — OB RN PATIENT PROFILE - NS_PRENATALLABSOURCEGBS36_OBGYN_ALL_OB
1/10/2023  Ms. Consuelo Donaldson is here for f/u of rectal pressure and reflux. She has a long hx of rectal pressure and constipation. She is a  and can only take her linzess on the weekends. She takes miralax during the week but does not have a BM until she takes linzess. She had a colonoscopy with a TA polyp and recto-sigmoid redundancy. We discussed taking linzess in the evening so she could take it daily. this did not work and she is still just taking on the weekends. She did not get her dexilant. her reflux continues to be poorly controlled. She is not following a low fiber diet. She denies any nausea or vomiting. CS hard copy, drawn during this pregnancy

## 2023-02-06 NOTE — OB RN PATIENT PROFILE - NS PRO TALK SOMEONE YN
unable to assess Taltz Pregnancy And Lactation Text: The risk during pregnancy and breastfeeding is uncertain with this medication.

## 2023-08-16 NOTE — OB RN TRIAGE NOTE - NS_MODEOFARRIVAL_OBGYN_ALL_OB
Detail Level: Simple Render Risk Assessment In Note?: no Additional Notes: Look for bleeding or scabbing in the scar which may mean that your skin cancer is recurring. Car

## 2024-01-18 NOTE — OB RN PATIENT PROFILE - FALL HARM RISK CONCLUSION
[FreeTextEntry1] : follow up [de-identified] : IBS - had stopped metamucil but developed yellow tinged mucus discharge - once she resumed etamucil the mucus resolved  HTN - BP at home is slightly elevated in 130s - complaint w/ losartan 50mg daily - checking BP stresses her out and may contribute to elevated readings - in office today if distracted BP is much improved.  B12 def - B12 injection today Universal Safety Interventions

## 2024-03-14 NOTE — OB PROVIDER H&P - NSHPPHYSICALEXAM_GEN_ALL_CORE
Requesting 90 days supply    
Resent for 90 day supply with remaining refills.   
T(C): 36.5 (07-26-21 @ 11:49), Max: 36.5 (07-26-21 @ 11:27)  HR: 77 (07-26-21 @ 11:51) (77 - 77)  BP: 116/64 (07-26-21 @ 11:51) (116/64 - 116/67)  RR: 16 (07-26-21 @ 11:27) (16 - 16)  SpO2: --    General: A&Ox3, appropriate, NAD  Cardiac: Regular rate and rhythm  Resp: CTAB  Abd: Gravid, soft, nontender  SVE: 5/90/ bulging bag  EFM: 150, moderate variability, +accel, +variable, Cat 2  [place]: q 5-6 min  TAS: vtx

## 2024-03-18 NOTE — DISCHARGE NOTE OB - NPI NUMBER (FOR SYSADMIN USE ONLY) :
CTAP wc s/o moderate stool burden, diverticula w/o infection. Negative KUB.    - optimizing bowel regimen.      [[id number]]

## 2024-04-01 NOTE — ASU PATIENT PROFILE, ADULT - PRO MENTAL HEALTH SX RECENT
73y M w/ pmh of HTN, ITP, APL syndrome, generalized anxiety disorder, OCD, newly dx aggressive Merkel Cell cancer of forehead- pt's oncologist  Dr. Eng, at [facility] on [facility] presents with complaints of Chest pain, anxiety and vertigo. Patient is a poor historian, history supplemented by wife. As per patient he has had dizziness all day, He is unable to describe whether he feels lightheadedness or vertigo, reports he feels like he is "moving when my eyes closed". The feeling has been persistent and constant, no worsening or alleviating factors. Patients wife at bedside (Joelle) reports he has had vertigo in the past, and she thinks its because he got water in his ear while showering. He also complaints of chest pain, that is constant, reports its from his anxiety, feels tightness in his chest. Pain in chest is non-radiating, constant, not positional or associated with meals.   Of note patient has had a recent complicated hospital course, was admitted from 10/24 - 12/26/23. Pt was admitted and seen by neurology, psychiatry, ID, endocrinology, cardiology, on oncology. He had very poor oral intake due to a delusion that he had metal objects in his mouth and throat. He underwent a neurological workup, was trialed on Luvox, remeron, seroquel, haldol, and zyprexa without improvement. Eventually he consented to a PEG. course was complicated by adrenal insufficiency and orthostatic hypotension. His cortisol levels were low and he was started on prednisone and florinef with some improvement. Course was also complicated by sepsis due to UTI, thrombophlebitis. As per wife he has been slowly getting worse, has been bedbound with no motivation to move. none

## 2024-05-01 ENCOUNTER — APPOINTMENT (OUTPATIENT)
Dept: OBGYN | Facility: CLINIC | Age: 34
End: 2024-05-01

## 2024-05-01 VITALS
WEIGHT: 152 LBS | SYSTOLIC BLOOD PRESSURE: 108 MMHG | DIASTOLIC BLOOD PRESSURE: 71 MMHG | HEART RATE: 84 BPM | HEIGHT: 65 IN | OXYGEN SATURATION: 97 % | TEMPERATURE: 97.7 F | RESPIRATION RATE: 15 BRPM | BODY MASS INDEX: 25.33 KG/M2

## 2024-05-20 NOTE — HISTORY OF PRESENT ILLNESS
[FreeTextEntry1] : JACQUELIEN YATES ,34 YO,G 4 P  3013  Presents for Annual    PMHX: None  PSHX: Tubal Ligation , [place]  OBHX: 3  , 1 TOP  GYNHX : Left Ovarian Cyst  Never a smoker  No Medication  No OCP LMP:  24   Regular  Menses Sexually active not using contraception Last pap was on 2021-- Abnormal  Last Mammo: None  No Family History of breast cancer

## 2024-10-28 ENCOUNTER — EMERGENCY (EMERGENCY)
Facility: HOSPITAL | Age: 34
LOS: 0 days | Discharge: HOME | End: 2024-10-28
Attending: EMERGENCY MEDICINE

## 2024-10-28 VITALS
SYSTOLIC BLOOD PRESSURE: 104 MMHG | HEIGHT: 65 IN | TEMPERATURE: 98 F | OXYGEN SATURATION: 98 % | HEART RATE: 68 BPM | DIASTOLIC BLOOD PRESSURE: 71 MMHG | RESPIRATION RATE: 14 BRPM | WEIGHT: 154.98 LBS

## 2024-10-28 DIAGNOSIS — Z98.890 OTHER SPECIFIED POSTPROCEDURAL STATES: Chronic | ICD-10-CM

## 2024-10-28 DIAGNOSIS — Z33.2 ENCOUNTER FOR ELECTIVE TERMINATION OF PREGNANCY: Chronic | ICD-10-CM

## 2024-10-28 PROCEDURE — 99284 EMERGENCY DEPT VISIT MOD MDM: CPT

## 2024-10-28 NOTE — ED ADULT NURSE NOTE - OBJECTIVE STATEMENT
33 yr old female AOx4. C/o bloodborne pathogen exposure. Pt was working in OR today. Blood splashed in face during CBI from source pt of unknown medical hx. Reports rinsing eyes and face several times. Denies PMH. LMP 10/1/24. Pt denies any pain, CP, SOB, N/V/D, fever/chills ,h/a, dizziness.

## 2024-10-28 NOTE — ED ADULT NURSE NOTE - NSICDXNOFAMILYHX_GEN_ALL_CORE
Renal Quick Note  Serum sodium has been stable  Will sign off and be available as needed  Thank you for the consult <-- Click to add NO pertinent Family History

## 2024-10-28 NOTE — ED PROVIDER NOTE - PATIENT PORTAL LINK FT
You can access the FollowMyHealth Patient Portal offered by [facility] by registering at the following website: [email or web address]. By joining "[employer]’s FollowMyHealth portal, you will also be able to view your health information using other applications (apps) compatible with our system.

## 2024-10-28 NOTE — ED ADULT NURSE NOTE - NSFALLUNIVINTERV_ED_ALL_ED
Bed/Stretcher in lowest position, wheels locked, appropriate side rails in place/Call bell, personal items and telephone in reach/Instruct patient to call for assistance before getting out of bed/chair/stretcher/Non-slip footwear applied when patient is off stretcher/[place] to call system/Physically safe environment - no spills, clutter or unnecessary equipment/Purposeful proactive rounding/Room/bathroom lighting operational, light cord in reach

## 2024-10-28 NOTE — ED ADULT TRIAGE NOTE - CHIEF COMPLAINT QUOTE
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  patient flushed eyes and wash her face several times. Denies history.  LMP 10/1/24.

## 2024-10-28 NOTE — ED PROVIDER NOTE - CLINICAL SUMMARY MEDICAL DECISION MAKING FREE TEXT BOX
Patient with vitally fluid splashed to eye with known hematuria.  Discussion with infectious disease Dr.– Patient with vitally fluid splashed to eye with known hematuria.  Discussion with infectious disease Dr. Whyte - states low risk exposure but as risk is not 0 would leave it up to pt for her comfort with situation - pt would like to start on PEP at this time - will dc with medicine from ED and have pt follow up EHS in coming 1-2 days.

## 2024-10-28 NOTE — ED PROVIDER NOTE - NSFOLLOWUPINSTRUCTIONS_ED_ALL_ED_FT
Postexposure Prophylaxis    WHAT YOU NEED TO KNOW:    Postexposure prophylaxis (PEP) is medical care given to prevent HIV, hepatitis B, and other diseases. PEP may include first aid, testing, and medicines. Exposure happens when you have contact with another person's blood, semen, or vaginal fluid. You are exposed if these fluids touch an open area of your skin, such as a cut, or touch a mucus membrane. You can also be exposed by a stick from an infected needle.    DISCHARGE INSTRUCTIONS:    Return to the emergency department if:    You have a fever.    You have a rash.    You have new muscle pain or pain in your back or abdomen.    You urinate more often than usual, have blood in your urine, or have pain while urinating.    You are more thirsty than usual.    You have trouble swallowing or breathing.  Call your doctor if:    You have nausea or diarrhea.    You are more tired than usual.    You have new headaches, or you feel dizzy.    You have trouble sleeping.    Your eyes or skin turn yellow.    You are not eating because of appetite loss.    You are or may be pregnant.    You have questions or concerns about your condition or care.  Medicines: You may need any of the following:    Antiretrovirals help prevent HIV. They must be taken for 28 days, unless your healthcare provider tells you otherwise. You may be given a starter pack with enough medicine for 1 to 7 days. You may be given medicine for the full 28 days instead. If you receive a starter pack, you must return to your provider in 1 to 7 days. At this visit, you will receive the rest of the medicine.    The hepatitis B vaccine helps prevent hepatitis B. You will need 3 doses (shots) of the vaccine. The second dose should be taken 1 to 2 months after the first dose. The third dose should be taken 4 to 6 months after the first dose.    Antibiotics help treat or prevent a bacterial infection.    Take your medicine as directed. Contact your healthcare provider if you think your medicine is not helping or if you have side effects. Tell your provider if you are allergic to any medicine. Keep a list of the medicines, vitamins, and herbs you take. Include the amounts, and when and why you take them. Bring the list or the pill bottles to follow-up visits. Carry your medicine list with you in case of an emergency.  Precautions: In case you are infected, you will need to take steps to prevent infecting others:    Do not have sex, or only have safe sex. Safe sex means having sex only with one person who is not infected and who is only having sex with you. It also means using condoms each time you have sex.    Do not share needles with anyone. Always use needles that are sterile (germ-free). Talk to your healthcare provider about how to get clean needles.    Follow all safety rules at your workplace if you are at risk of exposure. Be sure to use safety equipment as needed.    Get the hepatitis B vaccine, if needed. Your healthcare provider can tell you if you need this vaccine.    Ask about breastfeeding. Do not breastfeed unless you know you are not infected. Talk to your provider if you have any questions or concerns about breastfeeding.  In case of future exposure: If you think you have been exposed, get first aid right away. If you are at work, follow work policy to report the exposure. The type of first aid you need depends on the part of your body that was exposed:    Wash the area on and around open skin right away with soap and water. Use a gel hand  if you do not have soap or running water. Do not use anything harsh, such as bleach. Do not squeeze or rub the skin.    Rinse your eyes with water or saline (a salt solution) right away. Be sure to clean well by moving your eyelids with your fingers as you rinse. Keep contact lenses in while rinsing your eyes, then remove and clean them as usual. Avoid soap or other .    Spit out the blood or body fluid right away. Rinse your mouth with water or saline several times. Do not put soap or other  in your mouth.  Follow up with your doctor as directed: If you did not receive any treatment after the exposure, you will need to follow up within 1 week. If you were given antiretrovirals, you will need a follow-up visit in about 2 weeks. More HIV testing will be needed 6 weeks, 3 months, and 6 months after the exposure. You may have HIV testing up to 12 months after the exposure. Write down your questions so you remember to ask them during your visits.    For support and more information:    National Center for HIV/AIDS, Viral Hepatitis, STD, and TB Prevention, CDC  Web Address: www.cdc.gov/nchhstp/  The National Clinicians’ Post-Exposure Prophylaxis Hotline  Web Address: www.ncc.[facility].edu/about_nccc/pepline/

## 2024-10-28 NOTE — ED PROVIDER NOTE - OBJECTIVE STATEMENT
33-year-old female with no significant past medical history and with past surgical history of bilateral salpingectomy, LASEK surgery otherwise presenting status post exposure to bloody urine in the eye.  Patient states that she was working and one of the physician assistants was removing the urinary catheter from the patient to place on CBI and the hand slipped and the tubing flipped towards her causing some small amount of liquid to splash onto her eye.  Patient then washed out her eye immediately and came down to ED for evaluation.

## 2024-10-29 DIAGNOSIS — Z77.21 CONTACT WITH AND (SUSPECTED) EXPOSURE TO POTENTIALLY HAZARDOUS BODY FLUIDS: ICD-10-CM

## 2024-10-29 DIAGNOSIS — Z90.722 ACQUIRED ABSENCE OF OVARIES, BILATERAL: ICD-10-CM
